# Patient Record
Sex: FEMALE | Race: WHITE | NOT HISPANIC OR LATINO | URBAN - METROPOLITAN AREA
[De-identification: names, ages, dates, MRNs, and addresses within clinical notes are randomized per-mention and may not be internally consistent; named-entity substitution may affect disease eponyms.]

---

## 2017-10-24 ENCOUNTER — INPATIENT (INPATIENT)
Facility: HOSPITAL | Age: 22
LOS: 2 days | Discharge: HOME | DRG: 392 | End: 2017-10-27
Attending: HOSPITALIST | Admitting: HOSPITALIST
Payer: MEDICAID

## 2017-10-24 VITALS
RESPIRATION RATE: 17 BRPM | HEART RATE: 93 BPM | SYSTOLIC BLOOD PRESSURE: 108 MMHG | DIASTOLIC BLOOD PRESSURE: 75 MMHG | TEMPERATURE: 98 F | OXYGEN SATURATION: 98 %

## 2017-10-24 DIAGNOSIS — Z29.9 ENCOUNTER FOR PROPHYLACTIC MEASURES, UNSPECIFIED: ICD-10-CM

## 2017-10-24 DIAGNOSIS — R11.2 NAUSEA WITH VOMITING, UNSPECIFIED: ICD-10-CM

## 2017-10-24 DIAGNOSIS — R10.9 UNSPECIFIED ABDOMINAL PAIN: ICD-10-CM

## 2017-10-24 DIAGNOSIS — Z90.49 ACQUIRED ABSENCE OF OTHER SPECIFIED PARTS OF DIGESTIVE TRACT: Chronic | ICD-10-CM

## 2017-10-24 DIAGNOSIS — Z90.89 ACQUIRED ABSENCE OF OTHER ORGANS: Chronic | ICD-10-CM

## 2017-10-24 DIAGNOSIS — D68.0 VON WILLEBRAND DISEASE: ICD-10-CM

## 2017-10-24 DIAGNOSIS — K92.2 GASTROINTESTINAL HEMORRHAGE, UNSPECIFIED: ICD-10-CM

## 2017-10-24 LAB
ALBUMIN SERPL ELPH-MCNC: 4.8 G/DL — SIGNIFICANT CHANGE UP (ref 3.3–5)
ALP SERPL-CCNC: 68 U/L — SIGNIFICANT CHANGE UP (ref 40–120)
ALT FLD-CCNC: 11 U/L RC — SIGNIFICANT CHANGE UP (ref 10–45)
ANION GAP SERPL CALC-SCNC: 14 MMOL/L — SIGNIFICANT CHANGE UP (ref 5–17)
APPEARANCE UR: ABNORMAL
APTT BLD: 32.4 SEC — SIGNIFICANT CHANGE UP (ref 27.5–37.4)
AST SERPL-CCNC: 16 U/L — SIGNIFICANT CHANGE UP (ref 10–40)
BASOPHILS # BLD AUTO: 0 K/UL — SIGNIFICANT CHANGE UP (ref 0–0.2)
BASOPHILS NFR BLD AUTO: 0.6 % — SIGNIFICANT CHANGE UP (ref 0–2)
BILIRUB SERPL-MCNC: 1 MG/DL — SIGNIFICANT CHANGE UP (ref 0.2–1.2)
BILIRUB UR-MCNC: NEGATIVE — SIGNIFICANT CHANGE UP
BLD GP AB SCN SERPL QL: NEGATIVE — SIGNIFICANT CHANGE UP
BUN SERPL-MCNC: 9 MG/DL — SIGNIFICANT CHANGE UP (ref 7–23)
CALCIUM SERPL-MCNC: 9.7 MG/DL — SIGNIFICANT CHANGE UP (ref 8.4–10.5)
CHLORIDE SERPL-SCNC: 100 MMOL/L — SIGNIFICANT CHANGE UP (ref 96–108)
CO2 SERPL-SCNC: 28 MMOL/L — SIGNIFICANT CHANGE UP (ref 22–31)
COLOR SPEC: YELLOW — SIGNIFICANT CHANGE UP
COMMENT - URINE: SIGNIFICANT CHANGE UP
CREAT SERPL-MCNC: 0.67 MG/DL — SIGNIFICANT CHANGE UP (ref 0.5–1.3)
CRP SERPL-MCNC: 2 MG/DL — HIGH (ref 0–0.4)
DIFF PNL FLD: NEGATIVE — SIGNIFICANT CHANGE UP
EOSINOPHIL # BLD AUTO: 0.1 K/UL — SIGNIFICANT CHANGE UP (ref 0–0.5)
EOSINOPHIL NFR BLD AUTO: 1.1 % — SIGNIFICANT CHANGE UP (ref 0–6)
EPI CELLS # UR: SIGNIFICANT CHANGE UP /HPF
ERYTHROCYTE [SEDIMENTATION RATE] IN BLOOD: 14 MM/HR — SIGNIFICANT CHANGE UP (ref 0–15)
GAS PNL BLDV: SIGNIFICANT CHANGE UP
GLUCOSE SERPL-MCNC: 98 MG/DL — SIGNIFICANT CHANGE UP (ref 70–99)
GLUCOSE UR QL: NEGATIVE — SIGNIFICANT CHANGE UP
HCG UR QL: NEGATIVE — SIGNIFICANT CHANGE UP
HCT VFR BLD CALC: 40.3 % — SIGNIFICANT CHANGE UP (ref 34.5–45)
HCT VFR BLD CALC: 41.2 % — SIGNIFICANT CHANGE UP (ref 34.5–45)
HGB BLD-MCNC: 13.9 G/DL — SIGNIFICANT CHANGE UP (ref 11.5–15.5)
HGB BLD-MCNC: 14.4 G/DL — SIGNIFICANT CHANGE UP (ref 11.5–15.5)
INR BLD: 1.15 RATIO — SIGNIFICANT CHANGE UP (ref 0.88–1.16)
KETONES UR-MCNC: ABNORMAL
LEUKOCYTE ESTERASE UR-ACNC: ABNORMAL
LYMPHOCYTES # BLD AUTO: 1.3 K/UL — SIGNIFICANT CHANGE UP (ref 1–3.3)
LYMPHOCYTES # BLD AUTO: 20.5 % — SIGNIFICANT CHANGE UP (ref 13–44)
MCHC RBC-ENTMCNC: 30.8 PG — SIGNIFICANT CHANGE UP (ref 27–34)
MCHC RBC-ENTMCNC: 31.3 PG — SIGNIFICANT CHANGE UP (ref 27–34)
MCHC RBC-ENTMCNC: 34.4 GM/DL — SIGNIFICANT CHANGE UP (ref 32–36)
MCHC RBC-ENTMCNC: 34.8 GM/DL — SIGNIFICANT CHANGE UP (ref 32–36)
MCV RBC AUTO: 89.6 FL — SIGNIFICANT CHANGE UP (ref 80–100)
MCV RBC AUTO: 89.8 FL — SIGNIFICANT CHANGE UP (ref 80–100)
MONOCYTES # BLD AUTO: 0.6 K/UL — SIGNIFICANT CHANGE UP (ref 0–0.9)
MONOCYTES NFR BLD AUTO: 10 % — SIGNIFICANT CHANGE UP (ref 2–14)
NEUTROPHILS # BLD AUTO: 4.2 K/UL — SIGNIFICANT CHANGE UP (ref 1.8–7.4)
NEUTROPHILS NFR BLD AUTO: 67.8 % — SIGNIFICANT CHANGE UP (ref 43–77)
NITRITE UR-MCNC: NEGATIVE — SIGNIFICANT CHANGE UP
PH UR: 6 — SIGNIFICANT CHANGE UP (ref 5–8)
PLATELET # BLD AUTO: 225 K/UL — SIGNIFICANT CHANGE UP (ref 150–400)
PLATELET # BLD AUTO: 257 K/UL — SIGNIFICANT CHANGE UP (ref 150–400)
POTASSIUM SERPL-MCNC: 3.9 MMOL/L — SIGNIFICANT CHANGE UP (ref 3.5–5.3)
POTASSIUM SERPL-SCNC: 3.9 MMOL/L — SIGNIFICANT CHANGE UP (ref 3.5–5.3)
PROT SERPL-MCNC: 7.5 G/DL — SIGNIFICANT CHANGE UP (ref 6–8.3)
PROT UR-MCNC: 30 MG/DL
PROTHROM AB SERPL-ACNC: 12.6 SEC — SIGNIFICANT CHANGE UP (ref 9.8–12.7)
RBC # BLD: 4.5 M/UL — SIGNIFICANT CHANGE UP (ref 3.8–5.2)
RBC # BLD: 4.59 M/UL — SIGNIFICANT CHANGE UP (ref 3.8–5.2)
RBC # FLD: 10.9 % — SIGNIFICANT CHANGE UP (ref 10.3–14.5)
RBC # FLD: 11 % — SIGNIFICANT CHANGE UP (ref 10.3–14.5)
RBC CASTS # UR COMP ASSIST: SIGNIFICANT CHANGE UP /HPF (ref 0–2)
RH IG SCN BLD-IMP: POSITIVE — SIGNIFICANT CHANGE UP
SODIUM SERPL-SCNC: 142 MMOL/L — SIGNIFICANT CHANGE UP (ref 135–145)
SP GR SPEC: >1.03 — HIGH (ref 1.01–1.02)
UROBILINOGEN FLD QL: 1
WBC # BLD: 4 K/UL — SIGNIFICANT CHANGE UP (ref 3.8–10.5)
WBC # BLD: 6.2 K/UL — SIGNIFICANT CHANGE UP (ref 3.8–10.5)
WBC # FLD AUTO: 4 K/UL — SIGNIFICANT CHANGE UP (ref 3.8–10.5)
WBC # FLD AUTO: 6.2 K/UL — SIGNIFICANT CHANGE UP (ref 3.8–10.5)
WBC UR QL: SIGNIFICANT CHANGE UP /HPF (ref 0–5)

## 2017-10-24 PROCEDURE — 99223 1ST HOSP IP/OBS HIGH 75: CPT

## 2017-10-24 PROCEDURE — 74177 CT ABD & PELVIS W/CONTRAST: CPT | Mod: 26

## 2017-10-24 PROCEDURE — 99285 EMERGENCY DEPT VISIT HI MDM: CPT | Mod: 25

## 2017-10-24 PROCEDURE — 99222 1ST HOSP IP/OBS MODERATE 55: CPT | Mod: GC

## 2017-10-24 RX ORDER — ONDANSETRON 8 MG/1
4 TABLET, FILM COATED ORAL ONCE
Qty: 0 | Refills: 0 | Status: COMPLETED | OUTPATIENT
Start: 2017-10-24 | End: 2017-10-24

## 2017-10-24 RX ORDER — SODIUM CHLORIDE 9 MG/ML
1000 INJECTION INTRAMUSCULAR; INTRAVENOUS; SUBCUTANEOUS
Qty: 0 | Refills: 0 | Status: DISCONTINUED | OUTPATIENT
Start: 2017-10-24 | End: 2017-10-26

## 2017-10-24 RX ORDER — ACETAMINOPHEN 500 MG
1000 TABLET ORAL ONCE
Qty: 0 | Refills: 0 | Status: COMPLETED | OUTPATIENT
Start: 2017-10-24 | End: 2017-10-24

## 2017-10-24 RX ORDER — SOD SULF/SODIUM/NAHCO3/KCL/PEG
1000 SOLUTION, RECONSTITUTED, ORAL ORAL
Qty: 0 | Refills: 0 | Status: COMPLETED | OUTPATIENT
Start: 2017-10-24 | End: 2017-10-24

## 2017-10-24 RX ORDER — ACETAMINOPHEN 500 MG
650 TABLET ORAL EVERY 6 HOURS
Qty: 0 | Refills: 0 | Status: DISCONTINUED | OUTPATIENT
Start: 2017-10-24 | End: 2017-10-27

## 2017-10-24 RX ORDER — SODIUM CHLORIDE 9 MG/ML
1000 INJECTION INTRAMUSCULAR; INTRAVENOUS; SUBCUTANEOUS ONCE
Qty: 0 | Refills: 0 | Status: COMPLETED | OUTPATIENT
Start: 2017-10-24 | End: 2017-10-24

## 2017-10-24 RX ORDER — ONDANSETRON 8 MG/1
4 TABLET, FILM COATED ORAL EVERY 8 HOURS
Qty: 0 | Refills: 0 | Status: DISCONTINUED | OUTPATIENT
Start: 2017-10-24 | End: 2017-10-24

## 2017-10-24 RX ORDER — MORPHINE SULFATE 50 MG/1
2 CAPSULE, EXTENDED RELEASE ORAL ONCE
Qty: 0 | Refills: 0 | Status: DISCONTINUED | OUTPATIENT
Start: 2017-10-24 | End: 2017-10-24

## 2017-10-24 RX ORDER — ONDANSETRON 8 MG/1
4 TABLET, FILM COATED ORAL EVERY 8 HOURS
Qty: 0 | Refills: 0 | Status: DISCONTINUED | OUTPATIENT
Start: 2017-10-24 | End: 2017-10-26

## 2017-10-24 RX ORDER — ACETAMINOPHEN 500 MG
650 TABLET ORAL EVERY 6 HOURS
Qty: 0 | Refills: 0 | Status: DISCONTINUED | OUTPATIENT
Start: 2017-10-24 | End: 2017-10-24

## 2017-10-24 RX ORDER — MORPHINE SULFATE 50 MG/1
4 CAPSULE, EXTENDED RELEASE ORAL EVERY 6 HOURS
Qty: 0 | Refills: 0 | Status: DISCONTINUED | OUTPATIENT
Start: 2017-10-24 | End: 2017-10-26

## 2017-10-24 RX ORDER — INFLUENZA VIRUS VACCINE 15; 15; 15; 15 UG/.5ML; UG/.5ML; UG/.5ML; UG/.5ML
0.5 SUSPENSION INTRAMUSCULAR ONCE
Qty: 0 | Refills: 0 | Status: DISCONTINUED | OUTPATIENT
Start: 2017-10-24 | End: 2017-10-27

## 2017-10-24 RX ADMIN — MORPHINE SULFATE 4 MILLIGRAM(S): 50 CAPSULE, EXTENDED RELEASE ORAL at 17:53

## 2017-10-24 RX ADMIN — Medication 1000 MILLILITER(S): at 19:48

## 2017-10-24 RX ADMIN — Medication 650 MILLIGRAM(S): at 14:00

## 2017-10-24 RX ADMIN — ONDANSETRON 4 MILLIGRAM(S): 8 TABLET, FILM COATED ORAL at 17:53

## 2017-10-24 RX ADMIN — SODIUM CHLORIDE 4000 MILLILITER(S): 9 INJECTION INTRAMUSCULAR; INTRAVENOUS; SUBCUTANEOUS at 16:12

## 2017-10-24 RX ADMIN — Medication 650 MILLIGRAM(S): at 23:21

## 2017-10-24 RX ADMIN — Medication 400 MILLIGRAM(S): at 03:52

## 2017-10-24 RX ADMIN — ONDANSETRON 4 MILLIGRAM(S): 8 TABLET, FILM COATED ORAL at 03:52

## 2017-10-24 RX ADMIN — Medication 1000 MILLILITER(S): at 22:52

## 2017-10-24 RX ADMIN — SODIUM CHLORIDE 100 MILLILITER(S): 9 INJECTION INTRAMUSCULAR; INTRAVENOUS; SUBCUTANEOUS at 17:53

## 2017-10-24 RX ADMIN — MORPHINE SULFATE 4 MILLIGRAM(S): 50 CAPSULE, EXTENDED RELEASE ORAL at 18:08

## 2017-10-24 RX ADMIN — MORPHINE SULFATE 2 MILLIGRAM(S): 50 CAPSULE, EXTENDED RELEASE ORAL at 07:54

## 2017-10-24 RX ADMIN — MORPHINE SULFATE 2 MILLIGRAM(S): 50 CAPSULE, EXTENDED RELEASE ORAL at 07:08

## 2017-10-24 RX ADMIN — Medication 1000 MILLIGRAM(S): at 04:00

## 2017-10-24 RX ADMIN — SODIUM CHLORIDE 2000 MILLILITER(S): 9 INJECTION INTRAMUSCULAR; INTRAVENOUS; SUBCUTANEOUS at 03:52

## 2017-10-24 NOTE — H&P ADULT - PROBLEM SELECTOR PLAN 2
with anorexia and poor PO intake- likely 2/2 IBD vs. enteritis  - IVF hydration NS bolus 1L  x 1, followed by 100cc/hr  - clear liquid diet as tolerated, NPO p MN and moviprep for colonoscopy  - zofran PRN, check baseline EKG

## 2017-10-24 NOTE — H&P ADULT - FAMILY HISTORY
Mother  Still living? Unknown  Family history of irritable bowel syndrome, Age at diagnosis: Age Unknown

## 2017-10-24 NOTE — ED PROVIDER NOTE - MEDICAL DECISION MAKING DETAILS
Jim resident: 22 y/o female hx of 1 year of intermittent abd pain, diffuse, comes in episodes p/w abd pain and fever - per patient, few days of diffuse pain with diarrhea, with few episodes of bloody diarrhea - reports dec PO itnake -also reports dysuria for past few days - exam without signifcant tenderness or guarding - history suspicious for colitis - never been diagnosed with UC or chrons - never followed up with GI - will check labs, urine, hydrate, and consider CT for colitis, although patient ultimately needs GI workup

## 2017-10-24 NOTE — ED ADULT NURSE NOTE - OBJECTIVE STATEMENT
pt is 21 year old female c/o diffuse abd pain that she has had for about a year, worsening over the past 2 months, +diarrhea, +blood in stool, +low grade fever starting last night, +nausea, no  vomiting, pt reports burning on urination, recently had ob/gyn exam neg for std, sexually active with 1 partner and uses condoms, no back pain, no cough, no weight loss, no distention, pt has hx of appendectomy

## 2017-10-24 NOTE — ED PROVIDER NOTE - PROGRESS NOTE DETAILS
- MD Valeri,PhD - Resident: patient signed out to me. Had possible active extravasation seen on CTAP w/o OC IC. Asked to page GI. I had GI paged 3 times with no callback at about 0730, 0800, and again at 0900. Will defer GI page to floor team.

## 2017-10-24 NOTE — H&P ADULT - PROBLEM SELECTOR PLAN 4
- ppx: hold hsq pending H/O recommendations  - diet: clears followed by moviprep and NPO p MN  - dispo: medical floor

## 2017-10-24 NOTE — ED PROVIDER NOTE - ATTENDING CONTRIBUTION TO CARE
20 y/o female [facility]ram with 1 year hx of abd pain p.w abd pain and fever. Per patient, for past year gets intermittent bouts of abd pain with a few bloody diarrheas. pt with diffuse abd pain, but non surgical abdomin, soft. ct ordered, labs found to have collection in cecal region to admit for further work up.

## 2017-10-24 NOTE — H&P ADULT - NSHPLABSRESULTS_GEN_ALL_CORE
.  LABS:                         13.9   4.0   )-----------( 225      ( 24 Oct 2017 14:07 )             40.3     10-24    142  |  100  |  9   ----------------------------<  98  3.9   |  28  |  0.67    Ca    9.7      24 Oct 2017 03:49    TPro  7.5  /  Alb  4.8  /  TBili  1.0  /  DBili  x   /  AST  16  /  ALT  11  /  AlkPhos  68  10-24      Urinalysis Basic - ( 24 Oct 2017 03:51 )    Color: Yellow / Appearance: SL Turbid / SG: >1.030 / pH: x  Gluc: x / Ketone: Moderate  / Bili: Negative / Urobili: 1   Blood: x / Protein: 30 mg/dL / Nitrite: Negative   Leuk Esterase: Small / RBC: 0-2 /HPF / WBC 6-10 /HPF   Sq Epi: x / Non Sq Epi: Few /HPF / Bacteria: x      < from: CT Abdomen and Pelvis w/ Oral Cont and w/ IV Cont (10.24.17 @ 06:19) >    Few thickened loops of small bowel seen within the lower abdomen, may be   seen in the setting of enteritis.    Fluid/heterogeneous material filling the cecum with intraluminal area of   ill-defined hyperattenuation, which may represent blood, however, area of   active extravasation cannot be excluded. Correlate for degree of   patient's bleeding per rectum. If clinical suspicion for active GI bleed   is high, further evaluation with bleeding scan may be obtained.     Status post appendectomy.    < end of copied text >

## 2017-10-24 NOTE — ED PROVIDER NOTE - OBJECTIVE STATEMENT
22 y/o female with 1 year hx of abd pain p.w abd pain and fever. Per patient, for past year gets intermittent bouts of abd pain with optionally diarrhea. States this episode started on Wednesday and on Friday developed fever, Tmax 100.5. States today pain was so severe she was on the floor. Reports a few bloody diarrheas as well, with blood on the stool in the toilet. Associated few episodes of emesis, non bloody. NEver had colonoscopy or endoscopy. Sexually active with boyfriend, uses condoms regularly - just recently STD tested by OB/GYN. Does report occasionally dysuria without frequency. Denies vaginal discharge. Denies any CP, SOB, numbness, paresthesias, CVA tenderness. Has not taken anything for pain. 22 y/o female with 1 year hx of abd pain p.w abd pain and fever. Per patient, for past year gets intermittent bouts of abd pain with optionally diarrhea. States this episode started on Wednesday and on Friday developed fever, Tmax 100.5. States today pain was so severe she was on the floor. Reports a few bloody diarrheas as well, with blood on the stool in the toilet. Associated few episodes of emesis, non bloody. NEver had colonoscopy or endoscopy. Sexually active with boyfriend, uses condoms regularly - just recently STD tested by OB/GYN. Does report occasionally dysuria without frequency. Denies vaginal discharge. Denies any CP, SOB, numbness, paresthesias, CVA tenderness. Has not taken anything for pain. LMP 1 month ago. 22 y/o female [facility] Bertram with 1 year hx of abd pain p.w abd pain and fever. Per patient, for past year gets intermittent bouts of abd pain with optionally diarrhea. States this episode started on Wednesday and on Friday developed fever, Tmax 100.5. States today pain was so severe she was on the floor. Reports a few bloody diarrheas as well, with blood on the stool in the toilet. Associated few episodes of emesis, non bloody. NEver had colonoscopy or endoscopy. Sexually active with boyfriend, uses condoms regularly - just recently STD tested by OB/GYN. Does report occasionally dysuria without frequency. Denies vaginal discharge. Denies any CP, SOB, numbness, paresthesias, CVA tenderness. Has not taken anything for pain. LMP 1 month ago.

## 2017-10-24 NOTE — H&P ADULT - HISTORY OF PRESENT ILLNESS
21f hx vwd, chronic abdominal pain x 1 year presenting with acute on chronic abdominal pain x 2-3 days. States she always has low level, diffusely crampy/dull abdominal pain which intermittently flares, but recently experienced worsening abdominal pain, mainly periumbilical which has been constant, rated 6-7/10 at its worst, associated with nausea and 1 episode of nbnb vomiting last night, anorexia without significant weight loss x several days, fevers with 100.5 being the highest. Also has had diarrhea, 5-6 bowel movements daily x 2-3 days, nonbloody and mainly loose/watery, with some mucous passage. Unsure about IBD history in family. Reports travel in june to [place], however no change in baseline symptomatology during/after trip. No other symptoms reported- no dysuria, no cough, no chest pain.     In ED: 98.1, 69, 103/72, 18, 99RA. Given morphine 2mg iv x 1, tylenol 650mg po x 1 with minor relief. Orthostatics found to be negative. CT scan performed, GI consulted.

## 2017-10-24 NOTE — CONSULT NOTE ADULT - SUBJECTIVE AND OBJECTIVE BOX
Chief Complaint:  Patient is a 21y old  Female who presents with a chief complaint of     HPI:  22 y/o female PMH Bertram and chronic abd pain (for 1 year) presented to the ED with c/c of abd pain and fever.   As per the patient, she has had intermittent bouts of abd pain associated with diarrhea. Current episode started on Wednesday and on Friday she developed a fever, Tmax 100.5. Her abd pain was 10/10, assoc with blood in stool. She also had few episodes of emesis, non bloody.     She had never had colonoscopy or endoscopy.   Denies any CP, SOB, numbness, paresthesias, CVA tenderness. Has not taken anything for pain. LMP 1 month ago.      Allergies:  oxycodone (Hives)  Seafood (Hives)      Home Medications:    Hospital Medications:      PMHX/PSHX:  History of tonsillectomy  History of appendectomy      Family history:      Social History:   Sexually active with boyfriend, uses condoms regularly - just recently STD tested by OB/GYN.    ROS:     General:  No wt loss, fevers, chills, night sweats, fatigue,   Eyes:  Good vision, no reported pain  ENT:  No sore throat, pain, runny nose, dysphagia  CV:  No pain, palpitations, hypo/hypertension  Resp:  No dyspnea, cough, tachypnea, wheezing  GI:  See HPI  :  No pain, bleeding, incontinence, nocturia  Muscle:  No pain, weakness  Neuro:  No weakness, tingling, memory problems  Psych:  No fatigue, insomnia, mood problems, depression  Endocrine:  No polyuria, polydipsia, cold/heat intolerance  Heme:  No petechiae, ecchymosis, easy bruisability  Skin:  No rash, edema      PHYSICAL EXAM:     GENERAL:  Appears stated age, well-groomed, well-nourished, no distress  HEENT:  NC/AT,  conjunctivae clear and pink,  no JVD  CHEST:  Full & symmetric excursion, no increased effort, breath sounds clear  HEART:  Regular rhythm, S1, S2, no murmur/rub/S3/S4, no abdominal bruit, no edema  ABDOMEN:  Soft, non-tender, non-distended, normoactive bowel sounds,  no masses ,  EXTREMITIES:  no cyanosis,clubbing or edema  SKIN:  No rash/erythema/ecchymoses/petechiae/wounds/abscess/warm/dry  NEURO:  Alert, oriented    Vital Signs:  Vital Signs Last 24 Hrs  T(C): 36.7 (24 Oct 2017 09:36), Max: 36.9 (24 Oct 2017 01:38)  T(F): 98 (24 Oct 2017 09:36), Max: 98.5 (24 Oct 2017 01:38)  HR: 76 (24 Oct 2017 09:36) (76 - 93)  BP: 100/66 (24 Oct 2017 09:36) (100/66 - 108/75)  BP(mean): --  RR: 18 (24 Oct 2017 09:36) (16 - 18)  SpO2: 98% (24 Oct 2017 09:36) (95% - 98%)  Daily     Daily     LABS:                        14.4   6.2   )-----------( 257      ( 24 Oct 2017 03:49 )             41.2     10-24    142  |  100  |  9   ----------------------------<  98  3.9   |  28  |  0.67    Ca    9.7      24 Oct 2017 03:49    TPro  7.5  /  Alb  4.8  /  TBili  1.0  /  DBili  x   /  AST  16  /  ALT  11  /  AlkPhos  68  10-24    LIVER FUNCTIONS - ( 24 Oct 2017 03:49 )  Alb: 4.8 g/dL / Pro: 7.5 g/dL / ALK PHOS: 68 U/L / ALT: 11 U/L RC / AST: 16 U/L / GGT: x             Urinalysis Basic - ( 24 Oct 2017 03:51 )    Color: Yellow / Appearance: SL Turbid / SG: >1.030 / pH: x  Gluc: x / Ketone: Moderate  / Bili: Negative / Urobili: 1   Blood: x / Protein: 30 mg/dL / Nitrite: Negative   Leuk Esterase: Small / RBC: 0-2 /HPF / WBC 6-10 /HPF   Sq Epi: x / Non Sq Epi: Few /HPF / Bacteria: x          Imaging:      < from: CT Abdomen and Pelvis w/ Oral Cont and w/ IV Cont (10.24.17 @ 06:19) >  FINDINGS:    LOWER CHEST: Within normal limits.    LIVER: Within normal limits.  BILE DUCTS: Normal caliber.  GALLBLADDER: Within normal limits.  SPLEEN: Within normal limits.  PANCREAS: Within normal limits.  ADRENALS: Within normal limits.  KIDNEYS/URETERS: Within normal limits.    BLADDER: Within normal limits.  REPRODUCTIVE ORGANS: The uterus and adnexa are within normal limits.    BOWEL: No bowel obstruction. Oral contrast material has not yet reached   the cecum. A few thickened loops of small bowel seen within the lower   abdomen, which may be seen in the setting of enteritis. The patient is   status post appendectomy. The cecum is filled with fluid/heterogeneous   material with areas of central hyperattenuation, which is nonspecific.   There is fluid within the ascending colon.   PERITONEUM: Trace free fluid within the pelvis.  VESSELS:  Within normal limits.  RETROPERITONEUM: No lymphadenopathy.    ABDOMINAL WALL: Within normal limits.  BONES: Within normal limits.    IMPRESSION:     Few thickened loops of small bowel seen within the lower abdomen, may be   seen in the setting of enteritis.    Fluid/heterogeneous material filling the cecum with intraluminal area of   ill-defined hyperattenuation, which may represent blood, however, area of   active extravasation cannot be excluded. Correlate for degree of   patient's bleeding per rectum. If clinical suspicion for active GI bleed   is high, further evaluation with bleeding scan may be obtained.     Status post appendectomy.    < end of copied text > Chief Complaint:  Patient is a 21y old  Female who presents with a chief complaint of     HPI:  22 y/o female PMH Bertram and chronic abd pain (for 1 year) presented to the ED with c/c of abd pain and fever.   As per the patient, she has had intermittent bouts of abd pain associated with diarrhea. Current episode started on Wednesday and on Friday she developed a fever, Tmax 100.5. Her abd pain was 10/10, assoc with blood in stool. She also had few episodes of emesis, non bloody. She also has had passage of mucus in the BM along with blood.  She had never had colonoscopy or endoscopy. She saw a GI doctor one year ago and the workup including CT scan at that time was negative.     Denies any CP, SOB, numbness, paresthesias, CVA tenderness. Has not taken anything for pain. LMP 1 month ago.    Allergies:  oxycodone (Hives)  Seafood (Hives)      Home Medications:  none    Hospital Medications:  None    PMHX/PSHX:  History of tonsillectomy  History of appendectomy      Family history:    denies history of IBD or cancer.    Social History:   Sexually active with boyfriend, uses condoms regularly - just recently STD tested by OB/GYN.    ROS:     General:  No wt loss, fevers, chills, night sweats, fatigue,   Eyes:  Good vision, no reported pain  ENT:  No sore throat, pain, runny nose, dysphagia  CV:  No pain, palpitations, hypo/hypertension  Resp:  No dyspnea, cough, tachypnea, wheezing  GI:  See HPI  :  No pain, bleeding, incontinence, nocturia  Muscle:  No pain, weakness  Neuro:  No weakness, tingling, memory problems  Psych:  No fatigue, insomnia, mood problems, depression  Endocrine:  No polyuria, polydipsia, cold/heat intolerance  Heme:  No petechiae, ecchymosis, easy bruisability  Skin:  No rash, edema      PHYSICAL EXAM:     GENERAL:  Appears stated age, well-groomed, well-nourished, no distress  HEENT:  NC/AT,  conjunctivae clear and pink,  no JVD  CHEST:  Full & symmetric excursion, no increased effort, breath sounds clear  HEART:  Regular rhythm, S1, S2, no murmur/rub/S3/S4, no abdominal bruit, no edema  ABDOMEN:  Soft, non-tender, non-distended, normoactive bowel sounds,  no masses ,  EXTREMITIES:  no cyanosis,clubbing or edema  SKIN:  No rash/erythema/ecchymoses/petechiae/wounds/abscess/warm/dry  NEURO:  Alert, oriented    Vital Signs:  Vital Signs Last 24 Hrs  T(C): 36.7 (24 Oct 2017 09:36), Max: 36.9 (24 Oct 2017 01:38)  T(F): 98 (24 Oct 2017 09:36), Max: 98.5 (24 Oct 2017 01:38)  HR: 76 (24 Oct 2017 09:36) (76 - 93)  BP: 100/66 (24 Oct 2017 09:36) (100/66 - 108/75)  BP(mean): --  RR: 18 (24 Oct 2017 09:36) (16 - 18)  SpO2: 98% (24 Oct 2017 09:36) (95% - 98%)  Daily     Daily     LABS:                        14.4   6.2   )-----------( 257      ( 24 Oct 2017 03:49 )             41.2     10-24    142  |  100  |  9   ----------------------------<  98  3.9   |  28  |  0.67    Ca    9.7      24 Oct 2017 03:49    TPro  7.5  /  Alb  4.8  /  TBili  1.0  /  DBili  x   /  AST  16  /  ALT  11  /  AlkPhos  68  10-24    LIVER FUNCTIONS - ( 24 Oct 2017 03:49 )  Alb: 4.8 g/dL / Pro: 7.5 g/dL / ALK PHOS: 68 U/L / ALT: 11 U/L RC / AST: 16 U/L / GGT: x             Urinalysis Basic - ( 24 Oct 2017 03:51 )    Color: Yellow / Appearance: SL Turbid / SG: >1.030 / pH: x  Gluc: x / Ketone: Moderate  / Bili: Negative / Urobili: 1   Blood: x / Protein: 30 mg/dL / Nitrite: Negative   Leuk Esterase: Small / RBC: 0-2 /HPF / WBC 6-10 /HPF   Sq Epi: x / Non Sq Epi: Few /HPF / Bacteria: x          Imaging:      < from: CT Abdomen and Pelvis w/ Oral Cont and w/ IV Cont (10.24.17 @ 06:19) >  FINDINGS:    LOWER CHEST: Within normal limits.    LIVER: Within normal limits.  BILE DUCTS: Normal caliber.  GALLBLADDER: Within normal limits.  SPLEEN: Within normal limits.  PANCREAS: Within normal limits.  ADRENALS: Within normal limits.  KIDNEYS/URETERS: Within normal limits.    BLADDER: Within normal limits.  REPRODUCTIVE ORGANS: The uterus and adnexa are within normal limits.    BOWEL: No bowel obstruction. Oral contrast material has not yet reached   the cecum. A few thickened loops of small bowel seen within the lower   abdomen, which may be seen in the setting of enteritis. The patient is   status post appendectomy. The cecum is filled with fluid/heterogeneous   material with areas of central hyperattenuation, which is nonspecific.   There is fluid within the ascending colon.   PERITONEUM: Trace free fluid within the pelvis.  VESSELS:  Within normal limits.  RETROPERITONEUM: No lymphadenopathy.    ABDOMINAL WALL: Within normal limits.  BONES: Within normal limits.    IMPRESSION:     Few thickened loops of small bowel seen within the lower abdomen, may be   seen in the setting of enteritis.    Fluid/heterogeneous material filling the cecum with intraluminal area of   ill-defined hyperattenuation, which may represent blood, however, area of   active extravasation cannot be excluded. Correlate for degree of   patient's bleeding per rectum. If clinical suspicion for active GI bleed   is high, further evaluation with bleeding scan may be obtained.     Status post appendectomy.    < end of copied text > Chief Complaint:  Patient is a 21y old  Female who presents with a chief complaint of     HPI:  22 y/o female PMH Bertram and chronic abd pain (for 1 year) presented to the ED with c/c of abd pain and fever.   As per the patient, she has had intermittent bouts of abd pain associated with diarrhea. Current episode started on Wednesday and on Friday she developed a fever, Tmax 100.5. Her abd pain was 10/10, assoc with blood in stool. She also had few episodes of emesis, non bloody. She also has had passage of mucus in the BM along with blood.  She had never had colonoscopy or endoscopy. She saw a GI doctor one year ago and the workup including CT scan at that time was negative.     Denies any CP, SOB, numbness, paresthesias, CVA tenderness. Has not taken anything for pain. LMP 1 month ago.    Allergies:  oxycodone (Hives)  Seafood (Hives)      Home Medications:  none    Hospital Medications:  None    PMHX/PSHX:  History of tonsillectomy  History of appendectomy      Family history:    denies history of IBD or cancer.    Social History:   Sexually active with boyfriend, uses condoms regularly - just recently STD tested by OB/GYN.  No EtoH No other drugs    ROS:     General:  No wt loss, fevers, chills, night sweats, fatigue,   Eyes:  Good vision, no reported pain  ENT:  No sore throat, pain, runny nose, dysphagia  CV:  No pain, palpitations, hypo/hypertension  Resp:  No dyspnea, cough, tachypnea, wheezing  GI:  See HPI  :  No pain, bleeding, incontinence, nocturia  Muscle:  No pain, weakness  Neuro:  No weakness, tingling, memory problems  Psych:  No fatigue, insomnia, mood problems, depression  Endocrine:  No polyuria, polydipsia, cold/heat intolerance  Heme:  No petechiae, ecchymosis, easy bruisability  Skin:  No rash, edema      PHYSICAL EXAM:     GENERAL:  Appears stated age, well-groomed, well-nourished, no distress  HEENT:  NC/AT,  conjunctivae clear and pink,  no JVD  CHEST:  Full & symmetric excursion, no increased effort, breath sounds clear  HEART:  Regular rhythm, S1, S2, no murmur/rub/S3/S4, no abdominal bruit, no edema  ABDOMEN:  Soft, non-tender, non-distended, normoactive bowel sounds,  no masses ,  EXTREMITIES:  no cyanosis,clubbing or edema  SKIN:  No rash/erythema/ecchymoses/petechiae/wounds/abscess/warm/dry  NEURO:  Alert, oriented    Vital Signs:  Vital Signs Last 24 Hrs  T(C): 36.7 (24 Oct 2017 09:36), Max: 36.9 (24 Oct 2017 01:38)  T(F): 98 (24 Oct 2017 09:36), Max: 98.5 (24 Oct 2017 01:38)  HR: 76 (24 Oct 2017 09:36) (76 - 93)  BP: 100/66 (24 Oct 2017 09:36) (100/66 - 108/75)  BP(mean): --  RR: 18 (24 Oct 2017 09:36) (16 - 18)  SpO2: 98% (24 Oct 2017 09:36) (95% - 98%)  Daily     Daily     LABS:                        14.4   6.2   )-----------( 257      ( 24 Oct 2017 03:49 )             41.2     10-24    142  |  100  |  9   ----------------------------<  98  3.9   |  28  |  0.67    Ca    9.7      24 Oct 2017 03:49    TPro  7.5  /  Alb  4.8  /  TBili  1.0  /  DBili  x   /  AST  16  /  ALT  11  /  AlkPhos  68  10-24    LIVER FUNCTIONS - ( 24 Oct 2017 03:49 )  Alb: 4.8 g/dL / Pro: 7.5 g/dL / ALK PHOS: 68 U/L / ALT: 11 U/L RC / AST: 16 U/L / GGT: x             Urinalysis Basic - ( 24 Oct 2017 03:51 )    Color: Yellow / Appearance: SL Turbid / SG: >1.030 / pH: x  Gluc: x / Ketone: Moderate  / Bili: Negative / Urobili: 1   Blood: x / Protein: 30 mg/dL / Nitrite: Negative   Leuk Esterase: Small / RBC: 0-2 /HPF / WBC 6-10 /HPF   Sq Epi: x / Non Sq Epi: Few /HPF / Bacteria: x          Imaging:      < from: CT Abdomen and Pelvis w/ Oral Cont and w/ IV Cont (10.24.17 @ 06:19) >  FINDINGS:    LOWER CHEST: Within normal limits.    LIVER: Within normal limits.  BILE DUCTS: Normal caliber.  GALLBLADDER: Within normal limits.  SPLEEN: Within normal limits.  PANCREAS: Within normal limits.  ADRENALS: Within normal limits.  KIDNEYS/URETERS: Within normal limits.    BLADDER: Within normal limits.  REPRODUCTIVE ORGANS: The uterus and adnexa are within normal limits.    BOWEL: No bowel obstruction. Oral contrast material has not yet reached   the cecum. A few thickened loops of small bowel seen within the lower   abdomen, which may be seen in the setting of enteritis. The patient is   status post appendectomy. The cecum is filled with fluid/heterogeneous   material with areas of central hyperattenuation, which is nonspecific.   There is fluid within the ascending colon.   PERITONEUM: Trace free fluid within the pelvis.  VESSELS:  Within normal limits.  RETROPERITONEUM: No lymphadenopathy.    ABDOMINAL WALL: Within normal limits.  BONES: Within normal limits.    IMPRESSION:     Few thickened loops of small bowel seen within the lower abdomen, may be   seen in the setting of enteritis.    Fluid/heterogeneous material filling the cecum with intraluminal area of   ill-defined hyperattenuation, which may represent blood, however, area of   active extravasation cannot be excluded. Correlate for degree of   patient's bleeding per rectum. If clinical suspicion for active GI bleed   is high, further evaluation with bleeding scan may be obtained.     Status post appendectomy.    < end of copied text >

## 2017-10-24 NOTE — ED PROVIDER NOTE - GASTROINTESTINAL, MLM
Abdomen soft, mild tenderness throughout. No guarding. Abdomen soft, mild tenderness throughout, worse in periumbilical and epgiastric. No CVA tenderness b/l.

## 2017-10-24 NOTE — CONSULT NOTE ADULT - ATTENDING COMMENTS
Seen with fellow on 10/24  1. Ileitis/Colitis  2. Abdominal Pain    Personally reviewed CT scan. Given symptoms this is likely Crohn's ileitis. Check ESR and CRP. Should check PPD as well.   Plan for colonoscopy.

## 2017-10-24 NOTE — H&P ADULT - PROBLEM SELECTOR PLAN 3
No major bleed history. Per patient, required transfusion of blood products 'for clotting,' prior to shoulder surgery in May 2017. No complications of surgery or anesthesia. Has OP H/O.  - consult H/O re: whether any workup or therapy required prior to colonoscopy  - check PT/PTT  - monitor for signs and symptoms of bleeding No major bleed history. Per patient, required transfusion of blood products 'for clotting,' prior to shoulder surgery in May 2017. No complications of surgery or anesthesia. Has OP H/O.  - pt is high bleed risk for low bleed risk procedure- consult H/O re: any further workup or pre-endoscopy treatment.  - check PT/PTT  - monitor for signs and symptoms of bleeding

## 2017-10-24 NOTE — CONSULT NOTE ADULT - ASSESSMENT
Impression:     Plan: Impression:   - Abdominal pain with passage of mucus and blood in stool and episodes of diarrhea, ddx includes IBD vs IBS vs infection    Plan:   - monitor CBC, transfuse as needed  - send stool studies   - plan for colonoscopy on 10.25  - clear liquid diet today, moviprep in the evening today (GI fellow will order it) and npo after midnight  - supportive care as per primary team    GI team will continue to follow  Thank you for the consult.

## 2017-10-24 NOTE — H&P ADULT - NSHPSOCIALHISTORY_GEN_ALL_CORE
nonsmoker, occasional social drinker, no drugs, sexually active with boyfriend, uses condoms every time, no STD symptoms, had recent STD screen performed at [facility]. Goes to [employer], lives in dorm, no sick contacts.

## 2017-10-24 NOTE — H&P ADULT - PROBLEM SELECTOR PLAN 1
History highly concerning for IBD vs. possible focal enteritis vs possible  - colonoscopy with GI tomorrow 10/25. Clear liquid diet as tolerated, followed by moviprep (GI to order), followed by NPO p MN.  - pain control with morphine 4mg iv q6 prn severe pain, tylenol for breakthrough  - check stool studies- culture and wbcs, fecal calprotectin. No indication for cdiff at this time.  - serial abdominal exams

## 2017-10-25 ENCOUNTER — TRANSCRIPTION ENCOUNTER (OUTPATIENT)
Age: 22
End: 2017-10-25

## 2017-10-25 ENCOUNTER — RESULT REVIEW (OUTPATIENT)
Age: 22
End: 2017-10-25

## 2017-10-25 LAB
ANION GAP SERPL CALC-SCNC: 11 MMOL/L — SIGNIFICANT CHANGE UP (ref 5–17)
BUN SERPL-MCNC: 4 MG/DL — LOW (ref 7–23)
CALCIUM SERPL-MCNC: 8.8 MG/DL — SIGNIFICANT CHANGE UP (ref 8.4–10.5)
CHLORIDE SERPL-SCNC: 107 MMOL/L — SIGNIFICANT CHANGE UP (ref 96–108)
CO2 SERPL-SCNC: 24 MMOL/L — SIGNIFICANT CHANGE UP (ref 22–31)
CREAT SERPL-MCNC: 0.62 MG/DL — SIGNIFICANT CHANGE UP (ref 0.5–1.3)
CULTURE RESULTS: SIGNIFICANT CHANGE UP
FACT VIII ACT/NOR PPP: 80 % — SIGNIFICANT CHANGE UP (ref 60–125)
GLUCOSE SERPL-MCNC: 89 MG/DL — SIGNIFICANT CHANGE UP (ref 70–99)
HCT VFR BLD CALC: 37.3 % — SIGNIFICANT CHANGE UP (ref 34.5–45)
HGB BLD-MCNC: 12.7 G/DL — SIGNIFICANT CHANGE UP (ref 11.5–15.5)
MCHC RBC-ENTMCNC: 30.8 PG — SIGNIFICANT CHANGE UP (ref 27–34)
MCHC RBC-ENTMCNC: 34 GM/DL — SIGNIFICANT CHANGE UP (ref 32–36)
MCV RBC AUTO: 90.6 FL — SIGNIFICANT CHANGE UP (ref 80–100)
PLATELET # BLD AUTO: 217 K/UL — SIGNIFICANT CHANGE UP (ref 150–400)
POTASSIUM SERPL-MCNC: 4.1 MMOL/L — SIGNIFICANT CHANGE UP (ref 3.5–5.3)
POTASSIUM SERPL-SCNC: 4.1 MMOL/L — SIGNIFICANT CHANGE UP (ref 3.5–5.3)
RBC # BLD: 4.12 M/UL — SIGNIFICANT CHANGE UP (ref 3.8–5.2)
RBC # FLD: 11 % — SIGNIFICANT CHANGE UP (ref 10.3–14.5)
SODIUM SERPL-SCNC: 142 MMOL/L — SIGNIFICANT CHANGE UP (ref 135–145)
SPECIMEN SOURCE: SIGNIFICANT CHANGE UP
VWF AG ACT/NOR PPP IA: 78 % — SIGNIFICANT CHANGE UP (ref 63–170)
VWF:RCO ACT/NOR PPP PL AGG: 71 % — SIGNIFICANT CHANGE UP (ref 45–133)
WBC # BLD: 3.8 K/UL — SIGNIFICANT CHANGE UP (ref 3.8–10.5)
WBC # FLD AUTO: 3.8 K/UL — SIGNIFICANT CHANGE UP (ref 3.8–10.5)

## 2017-10-25 PROCEDURE — 99233 SBSQ HOSP IP/OBS HIGH 50: CPT

## 2017-10-25 PROCEDURE — 88305 TISSUE EXAM BY PATHOLOGIST: CPT | Mod: 26

## 2017-10-25 PROCEDURE — 44389 COLONOSCOPY WITH BIOPSY: CPT | Mod: GC

## 2017-10-25 RX ORDER — DESMOPRESSIN ACETATE 0.1 MG/1
21 TABLET ORAL ONCE
Qty: 0 | Refills: 0 | Status: DISCONTINUED | OUTPATIENT
Start: 2017-10-25 | End: 2017-10-25

## 2017-10-25 RX ORDER — DESMOPRESSIN ACETATE 0.1 MG/1
21 TABLET ORAL ONCE
Qty: 0 | Refills: 0 | Status: COMPLETED | OUTPATIENT
Start: 2017-10-25 | End: 2017-10-25

## 2017-10-25 RX ADMIN — Medication 650 MILLIGRAM(S): at 00:00

## 2017-10-25 RX ADMIN — ONDANSETRON 4 MILLIGRAM(S): 8 TABLET, FILM COATED ORAL at 20:53

## 2017-10-25 RX ADMIN — ONDANSETRON 4 MILLIGRAM(S): 8 TABLET, FILM COATED ORAL at 02:31

## 2017-10-25 RX ADMIN — MORPHINE SULFATE 4 MILLIGRAM(S): 50 CAPSULE, EXTENDED RELEASE ORAL at 03:03

## 2017-10-25 RX ADMIN — ONDANSETRON 4 MILLIGRAM(S): 8 TABLET, FILM COATED ORAL at 12:27

## 2017-10-25 RX ADMIN — Medication 650 MILLIGRAM(S): at 09:58

## 2017-10-25 RX ADMIN — Medication 650 MILLIGRAM(S): at 23:05

## 2017-10-25 RX ADMIN — Medication 650 MILLIGRAM(S): at 10:30

## 2017-10-25 RX ADMIN — MORPHINE SULFATE 4 MILLIGRAM(S): 50 CAPSULE, EXTENDED RELEASE ORAL at 12:17

## 2017-10-25 RX ADMIN — MORPHINE SULFATE 4 MILLIGRAM(S): 50 CAPSULE, EXTENDED RELEASE ORAL at 02:28

## 2017-10-25 RX ADMIN — MORPHINE SULFATE 4 MILLIGRAM(S): 50 CAPSULE, EXTENDED RELEASE ORAL at 12:45

## 2017-10-25 RX ADMIN — SODIUM CHLORIDE 100 MILLILITER(S): 9 INJECTION INTRAMUSCULAR; INTRAVENOUS; SUBCUTANEOUS at 02:32

## 2017-10-25 RX ADMIN — DESMOPRESSIN ACETATE 221 MICROGRAM(S): 0.1 TABLET ORAL at 15:55

## 2017-10-25 NOTE — PROGRESS NOTE ADULT - SUBJECTIVE AND OBJECTIVE BOX
Patient is a 21y old  Female who presents with a chief complaint of Gastrointestinal hemorrhage (25 Oct 2017 10:22)        SUBJECTIVE / OVERNIGHT EVENTS: diffuse abdominal tenderness worse on left, no nausea or vomiting. +loose stools (received prep last night). +blood in stool. +menses now.      MEDICATIONS  (STANDING):  influenza   Vaccine 0.5 milliLiter(s) IntraMuscular once  sodium chloride 0.9%. 1000 milliLiter(s) (100 mL/Hr) IV Continuous <Continuous>    MEDICATIONS  (PRN):  acetaminophen   Tablet. 650 milliGRAM(s) Oral every 6 hours PRN Moderate Pain (4 - 6)  morphine  - Injectable 4 milliGRAM(s) IV Push every 6 hours PRN Severe Pain (7 - 10)  ondansetron Injectable 4 milliGRAM(s) IV Push every 8 hours PRN Nausea and/or Vomiting      Vital Signs Last 24 Hrs  T(C): 36.8 (25 Oct 2017 12:08), Max: 36.8 (24 Oct 2017 17:31)  T(F): 98.3 (25 Oct 2017 12:08), Max: 98.3 (25 Oct 2017 12:08)  HR: 65 (25 Oct 2017 12:08) (65 - 75)  BP: 101/68 (25 Oct 2017 12:08) (98/59 - 110/72)  BP(mean): 84 (24 Oct 2017 15:13) (84 - 84)  RR: 18 (25 Oct 2017 12:08) (18 - 18)  SpO2: 98% (25 Oct 2017 12:08) (97% - 99%)  CAPILLARY BLOOD GLUCOSE        I&O's Summary    24 Oct 2017 07:01  -  25 Oct 2017 07:00  --------------------------------------------------------  IN: 3300 mL / OUT: 400 mL / NET: 2900 mL    25 Oct 2017 07:01  -  25 Oct 2017 14:49  --------------------------------------------------------  IN: 0 mL / OUT: 0 mL / NET: 0 mL        PHYSICAL EXAM:  GENERAL: NAD  HEAD:  Atraumatic, Normocephalic  EYES: conjunctiva and sclera clear  NECK: No JVD  CHEST/LUNG: CTA b/l  HEART: S1 S2 RRR  ABDOMEN: +BS Soft, diffuse tenderness - worse in LUQ and mid abd  EXTREMITIES:  2+ DP Pulses, No c/c/e  NEUROLOGY: AAOx3, no focal deficits   SKIN: No rashes or lesions    LABS:                        12.7   3.8   )-----------( 217      ( 25 Oct 2017 12:12 )             37.3     10-25    142  |  107  |  4<L>  ----------------------------<  89  4.1   |  24  |  0.62    Ca    8.8      25 Oct 2017 12:12    TPro  7.5  /  Alb  4.8  /  TBili  1.0  /  DBili  x   /  AST  16  /  ALT  11  /  AlkPhos  68  10-24    PT/INR - ( 24 Oct 2017 16:20 )   PT: 12.6 sec;   INR: 1.15 ratio         PTT - ( 24 Oct 2017 16:20 )  PTT:32.4 sec      Urinalysis Basic - ( 24 Oct 2017 03:51 )    Color: Yellow / Appearance: SL Turbid / SG: >1.030 / pH: x  Gluc: x / Ketone: Moderate  / Bili: Negative / Urobili: 1   Blood: x / Protein: 30 mg/dL / Nitrite: Negative   Leuk Esterase: Small / RBC: 0-2 /HPF / WBC 6-10 /HPF   Sq Epi: x / Non Sq Epi: Few /HPF / Bacteria: x        RADIOLOGY & ADDITIONAL TESTS:    Imaging Personally Reviewed:  Consultant(s) Notes Reviewed: GI   Care Discussed with Consultants/Other Providers:

## 2017-10-25 NOTE — DISCHARGE NOTE ADULT - ADDITIONAL INSTRUCTIONS
Follow up at GI clinic in 1-2 weeks. Repeat colonoscopy in 6 months for surveillance. Follow up at Gastroenterologist in 1-2 weeks. Repeat colonoscopy in 6 months for surveillance. Follow up at Gastroenterologist next week for biopsy results.  Repeat colonoscopy in 6 months for surveillance per GI recommendations.

## 2017-10-25 NOTE — DISCHARGE NOTE ADULT - CARE PROVIDER_API CALL
Shaan Gregorio), Gastroenterology; Internal Medicine  [address] [place] [address]  Phone: [phone]  Fax: [phone] Nahum Gutierrez), Gastroenterology; Internal Medicine  [address] [place] [address]  Phone: [phone]  Fax: [phone] Nahum Gutierrez (MD), Gastroenterology; Internal Medicine  [address] [facility]  [address]  [place] [address]  Phone: [phone]  Fax: [phone]    Dr. Omar  Phone: (   )    -  Fax: (   )    -    Vincenzo Mcclure), Pediatrics  [address] [facility] [address]  [place] [address]  Phone: [phone]  Fax: [phone]

## 2017-10-25 NOTE — CONSULT NOTE ADULT - SUBJECTIVE AND OBJECTIVE BOX
HPI: 21f hx type I vwd, chronic abdominal pain x 1 year presenting with acute on chronic abdominal pain x 2-3 days. States she always has low level, diffusely crampy/dull abdominal pain which intermittently flares, but recently experienced worsening abdominal pain, mainly periumbilical which has been constant, rated 6-7/10 at its worst, associated with nausea and 1 episode of nbnb vomiting last night, anorexia without significant weight loss x several days, fevers with 100.5 being the highest. Also has had diarrhea, 5-6 bowel movements daily x 2-3 days, nonbloody and mainly loose/watery, with some mucous passage. Unsure about IBD history in family. Reports travel in june to [place], however no change in baseline symptomatology during/after trip. No other symptoms reported- no dysuria, no cough, no chest pain.     Heme/Onc history: Patient was diagnosed with Type I Von Willebrand Disease in 2014 after a tonsillectomy where they found a clot in posterior pharynx after the procedure. Patient has had multiple surgeries that includes appendectomy and shoulder surgery, prior to which she received IV DDAVP. Currently follows Dr. Nelson's group practice as outpatient (ph: [phone]). Patient denied any complications after prior surgeries, denied requiring blood transfusions. Reports menorrhagia, currently menstruating started yesterday, usually last 6-7 days, not on OCP or iron supplements. Denied any family history of blood disorders.    REVIEW OF SYSTEMS:    CONSTITUTIONAL: No weakness, fevers or chills  EYES/ENT: No visual changes;  No vertigo or throat pain   NECK: No pain or stiffness  RESPIRATORY: No cough, wheezing, hemoptysis; No shortness of breath  CARDIOVASCULAR: No chest pain or palpitations  GASTROINTESTINAL: +low abdominal pain, + BRPR stool, No nausea, vomiting, or hematemesis; No diarrhea or constipation.   GENITOURINARY: No dysuria, frequency or hematuria  NEUROLOGICAL: No numbness or weakness  SKIN: No itching, burning, rashes, or lesions   All other review of systems is negative unless indicated above.    PAST MEDICAL & SURGICAL HISTORY:  Von Willebrand disease  History of tonsillectomy  History of appendectomy    FAMILY HISTORY:  Family history of irritable bowel syndrome (Mother)    SOCIAL HISTORY:  nonsmoker, occasional social drinker, no drugs, sexually active with boyfriend, uses condoms every time, no STD symptoms, [facility] Goes to [employer] [facility] [employer], lives in dorm, no sick contacts.    Allergies    oxycodone (Hives)  Seafood (Hives) HPI: 21f hx type I vwd, chronic abdominal pain x 1 year presenting with acute on chronic abdominal pain x 2-3 days. States she always has low level, diffusely crampy/dull abdominal pain which intermittently flares, but recently experienced worsening abdominal pain, mainly periumbilical which has been constant, rated 6-7/10 at its worst, associated with nausea and 1 episode of nbnb vomiting last night, anorexia without significant weight loss x several days, fevers with 100.5 being the highest. Also has had diarrhea, 5-6 bowel movements daily x 2-3 days, nonbloody and mainly loose/watery, with some mucous passage. Unsure about IBD history in family. Reports travel in june to [place], however no change in baseline symptomatology during/after trip. No other symptoms reported- no dysuria, no cough, no chest pain.     Heme/Onc history: Patient was diagnosed with Type I Von Willebrand Disease in 2014 after a tonsillectomy where they found a clot in posterior pharynx after the procedure. Patient has had multiple surgeries that includes appendectomy and shoulder surgery, prior to which she received IV DDAVP. Currently follows Dr. Nelson's group practice as outpatient (ph: [phone]). Patient denied any complications after prior surgeries, denied requiring blood transfusions. Reports menorrhagia, currently menstruating started yesterday, usually last 6-7 days, not on OCP or iron supplements. Denied any family history of blood disorders.    REVIEW OF SYSTEMS:    CONSTITUTIONAL: No weakness, fevers or chills  EYES/ENT: No visual changes;  No vertigo or throat pain   NECK: No pain or stiffness  RESPIRATORY: No cough, wheezing, hemoptysis; No shortness of breath  CARDIOVASCULAR: No chest pain or palpitations  GASTROINTESTINAL: +low abdominal pain, + BRPR stool, No nausea, vomiting, or hematemesis; No diarrhea or constipation.   GENITOURINARY: No dysuria, frequency or hematuria  NEUROLOGICAL: No numbness or weakness  SKIN: No itching, burning, rashes, or lesions   All other review of systems is negative unless indicated above.    PAST MEDICAL & SURGICAL HISTORY:  Von Willebrand disease  History of tonsillectomy  History of appendectomy    FAMILY HISTORY:  Family history of irritable bowel syndrome (Mother)    SOCIAL HISTORY:  nonsmoker, occasional social drinker, no drugs, sexually active with boyfriend, uses condoms every time, no STD symptoms, [facility] Goes to [employer] [facility] [employer], lives in dorm, no sick contacts.    Allergies    oxycodone (Hives)  Seafood (Hives)    Objectives:    Vital Signs Last 24 Hrs  T(C): 36.8 (25 Oct 2017 12:08), Max: 36.8 (24 Oct 2017 17:31)  T(F): 98.3 (25 Oct 2017 12:08), Max: 98.3 (25 Oct 2017 12:08)  HR: 65 (25 Oct 2017 12:08) (65 - 75)  BP: 101/68 (25 Oct 2017 12:08) (98/59 - 110/72)  BP(mean): 84 (24 Oct 2017 15:13) (84 - 84)  RR: 18 (25 Oct 2017 12:08) (18 - 18)  SpO2: 98% (25 Oct 2017 12:08) (97% - 99%)    LABS:                        12.7   3.8   )-----------( 217      ( 25 Oct 2017 12:12 )             37.3     10-25    142  |  107  |  4<L>  ----------------------------<  89  4.1   |  24  |  0.62    Ca    8.8      25 Oct 2017 12:12    TPro  7.5  /  Alb  4.8  /  TBili  1.0  /  DBili  x   /  AST  16  /  ALT  11  /  AlkPhos  68  10-24    < from: CT Abdomen and Pelvis w/ Oral Cont and w/ IV Cont (10.24.17 @ 06:19) >  IMPRESSION:     Few thickened loops of small bowel seen within the lower abdomen, may be   seen in the setting of enteritis.    Fluid/heterogeneous material filling the cecum with intraluminal area of   ill-defined hyperattenuation, which may represent blood, however, area of   active extravasation cannot be excluded. Correlate for degree of   patient's bleeding per rectum. If clinical suspicion for active GI bleed   is high, further evaluation with bleeding scan may be obtained.     Status post appendectomy.    < end of copied text > HPI: 21f hx type I vwd, chronic abdominal pain x 1 year presenting with acute on chronic abdominal pain x 2-3 days. States she always has low level, diffusely crampy/dull abdominal pain which intermittently flares, but recently experienced worsening abdominal pain, mainly periumbilical which has been constant, rated 6-7/10 at its worst, associated with nausea and 1 episode of nbnb vomiting last night, anorexia without significant weight loss x several days, fevers with 100.5 being the highest. Also has had diarrhea, 5-6 bowel movements daily x 2-3 days, nonbloody and mainly loose/watery, with some mucous passage. Unsure about IBD history in family. Reports travel in june to [place], however no change in baseline symptomatology during/after trip. No other symptoms reported- no dysuria, no cough, no chest pain.     Heme/Onc history: Patient was diagnosed with Type I Von Willebrand Disease in 2014 after a tonsillectomy where they found a clot in posterior pharynx after the procedure. Patient has had multiple surgeries that includes appendectomy and shoulder surgery, prior to which she received IV DDAVP. Currently follows Dr. Nelson's group practice as outpatient (ph: [phone]). Patient denied any complications after prior surgeries, denied requiring blood transfusions. Reports menorrhagia, currently menstruating started yesterday, usually last 6-7 days, not on OCP or iron supplements. Denied any family history of blood disorders.    REVIEW OF SYSTEMS:    CONSTITUTIONAL: No weakness, fevers or chills  EYES/ENT: No visual changes;  No vertigo or throat pain   NECK: No pain or stiffness  RESPIRATORY: No cough, wheezing, hemoptysis; No shortness of breath  CARDIOVASCULAR: No chest pain or palpitations  GASTROINTESTINAL: +low abdominal pain, + BRPR stool, No nausea, vomiting, or hematemesis; No diarrhea or constipation.   GENITOURINARY: No dysuria, frequency or hematuria  NEUROLOGICAL: No numbness or weakness  SKIN: No itching, burning, rashes, or lesions   All other review of systems is negative unless indicated above.    PAST MEDICAL & SURGICAL HISTORY:  Von Willebrand disease  History of tonsillectomy  History of appendectomy    FAMILY HISTORY:  Family history of irritable bowel syndrome (Mother)    SOCIAL HISTORY:  nonsmoker, occasional social drinker, no drugs, sexually active with boyfriend, uses condoms every time, no STD symptoms, [facility] Goes to [employer], lives in dorm, no sick contacts.    Allergies    oxycodone (Hives)  Seafood (Hives)    Objectives:    Vital Signs Last 24 Hrs  T(C): 36.8 (25 Oct 2017 12:08), Max: 36.8 (24 Oct 2017 17:31)  T(F): 98.3 (25 Oct 2017 12:08), Max: 98.3 (25 Oct 2017 12:08)  HR: 65 (25 Oct 2017 12:08) (65 - 75)  BP: 101/68 (25 Oct 2017 12:08) (98/59 - 110/72)  BP(mean): 84 (24 Oct 2017 15:13) (84 - 84)  RR: 18 (25 Oct 2017 12:08) (18 - 18)  SpO2: 98% (25 Oct 2017 12:08) (97% - 99%)    PHYSICAL EXAM:  General: WN/WD NAD  Neurology: A&Ox3, nonfocal, PAUL x 4  Eyes: PERRLA/ EOMI, Gross vision intact  ENT/Neck: Neck supple, trachea midline, No JVD, Gross hearing intact  Respiratory: CTA B/L, No wheezing, rales, rhonchi  CV: RRR, S1S2, no murmurs, rubs or gallops  Abdominal: Soft, +RLQ, LLQ tenderness, ND +BS,   Extremities: No edema, + peripheral pulses  Skin: No Rashes, Hematoma, Ecchymosis    LABS:                        12.7   3.8   )-----------( 217      ( 25 Oct 2017 12:12 )             37.3     10-25    142  |  107  |  4<L>  ----------------------------<  89  4.1   |  24  |  0.62    Ca    8.8      25 Oct 2017 12:12    TPro  7.5  /  Alb  4.8  /  TBili  1.0  /  DBili  x   /  AST  16  /  ALT  11  /  AlkPhos  68  10-24    < from: CT Abdomen and Pelvis w/ Oral Cont and w/ IV Cont (10.24.17 @ 06:19) >  IMPRESSION:     Few thickened loops of small bowel seen within the lower abdomen, may be   seen in the setting of enteritis.    Fluid/heterogeneous material filling the cecum with intraluminal area of   ill-defined hyperattenuation, which may represent blood, however, area of   active extravasation cannot be excluded. Correlate for degree of   patient's bleeding per rectum. If clinical suspicion for active GI bleed   is high, further evaluation with bleeding scan may be obtained.     Status post appendectomy.    < end of copied text >

## 2017-10-25 NOTE — DISCHARGE NOTE ADULT - PATIENT PORTAL LINK FT
“You can access the [facility] Patient Portal, offered by [facility], by registering with the following website: [email or web address]”

## 2017-10-25 NOTE — DISCHARGE NOTE ADULT - HOSPITAL COURSE
21f hx as above presenting with acute on chronic abdominal pain s/p colonoscopy showing ileitis s/p biopsy   21f hx as above presenting with acute on chronic abdominal pain      Problem/Plan - 1:  ·  Problem: Abdominal pain.  Plan: History highly concerning for IBD (family history of IBD) vs. possible focal enteritis  - s/p colonoscopy with biopsy 10/25. f/u results   start mesalamine per gi  -  tylenol for breakthrough  - check stool studies- culture and wbcs, fecal calprotectin. No indication for cdiff at this time.  - serial abdominal exams  h/h stable. History highly concerning for IBD (family history of IBD) vs. possible focal enteritis  - colonoscopy today. npo for procedure s/p prep  f/u colonoscopy  - pain control with morphine 4mg iv q6 prn severe pain, tylenol for breakthrough  - check stool studies- culture and wbcs, fecal calprotectin. No indication for cdiff at this time.  - serial abdominal exams  GI bleed with bleeding in the cecum on ct a/p - slight drop in h/h - monitor      Problem/Plan - 2:  ·  Problem: Nausea & vomiting.  Plan: poor PO intake- likely 2/2 IBD vs. enteritis     still very nauseous - continue clear liquids for now. poor PO intake- likely 2/2 IBD vs. enteritis - no significant weight loss per patient  NPO pending colonscopy  - zofran PRN      Problem/Plan - 3:  ·  Problem: Von Willebrand disease.  Plan: No major bleed history.   heme/onc f/u appreciated   - monitor for signs and symptoms of bleeding. No major bleed history. 21f hx as above presenting with acute on chronic abdominal pain s/p colonoscopy showing ileitis s/p biopsy   21f hx as above presenting with acute on chronic abdominal pain      Problem/Plan - 1:  ·  Problem: Abdominal pain.  Plan: History highly concerning for IBD (family history of IBD) vs. possible focal enteritis  - s/p colonoscopy with biopsy 10/25. f/u results   start mesalamine per gi  -  tylenol for breakthrough  - check stool studies- culture and wbcs, fecal calprotectin. No indication for cdiff at this time.  - serial abdominal exams  h/h stable. History highly concerning for IBD (family history of IBD) vs. possible focal enteritis  - colonoscopy today. npo for procedure s/p prep  f/u colonoscopy  - pain control with morphine 4mg iv q6 prn severe pain, tylenol for breakthrough  - check stool studies- culture and wbcs, fecal calprotectin. No indication for cdiff at this time.  - serial abdominal exams  GI bleed with bleeding in the cecum on ct a/p - slight drop in h/h - monitor      Problem/Plan - 2:  ·  Problem: Nausea & vomiting.  Plan: poor PO intake- likely 2/2 IBD vs. enteritis     NPO pending colonscopy  - zofran PRN  Problem/Plan - 3:  ·  Problem: Von Willebrand disease.  Plan: No major bleed history.   heme/onc f/u appreciated   - monitor for signs and symptoms of bleeding. No major bleed history. 21f hx as above presenting with acute on chronic abdominal pain s/p colonoscopy showing ileitis s/p biopsy   21f hx as above presenting with acute on chronic abdominal pain      Problem/Plan - 1:  ·  Problem: Abdominal pain.  Plan: History highly concerning for IBD (family history of IBD) vs. possible focal enteritis  - s/p colonoscopy with biopsy 10/25. f/u results   start mesalamine per gi  -  tylenol for breakthrough  - check stool studies- culture and wbcs, fecal calprotectin. No indication for cdiff at this time.  - serial abdominal exams  h/h stable. History highly concerning for IBD (family history of IBD) vs. possible focal enteritis  - colonoscopy today. npo for procedure s/p prep  f/u colonoscopy  - pain control with morphine 4mg iv q6 prn severe pain, tylenol for breakthrough  - check stool studies- culture and wbcs, fecal calprotectin. No indication for cdiff at this time.  - serial abdominal exams  GI bleed with bleeding in the cecum on ct a/p - slight drop in h/h - monitor      Problem/Plan - 2:  ·  Problem: Nausea & vomiting.  Plan: poor PO intake- likely 2/2 IBD vs. enteritis       Tolerating regular diet    NPO pending colonscopy  - zofran PRN  Problem/Plan - 3:  ·  Problem: Von Willebrand disease.  Plan: No major bleed history.   heme/onc f/u appreciated   - monitor for signs and symptoms of bleeding. No major bleed history. 21f h/o von willebrands disease presenting with acute on chronic abdominal pain. Gi consulted. s/p colonoscopy (recieved ddavp prior to colonoscopy per hematology). colonoscpy showed ileitis s/p bi 21f hx as above presenting with acute on chronic abdominal pain   opsy. finding highly concerning for IBD (family history of IBD) started on mesalamine. initially requiried morphine iv prn. diet advanced as tolerated. not requiring further pain medication. stool cultures negative to date. patient to follow up with GI - Dr. Gutierrez next week for biopsy results. History highly concerning for IBD (family history of IBD) vs. possible focal enteritis  - colonoscopy today. npo for procedure s/p prep  f/u colonoscopy  - pain control with morphine 4mg iv q6 prn severe pain, tylenol for breakthrough  - check stool studies- culture and wbcs, fecal calprotectin. No indication for cdiff at this time.  - serial abdominal exams  GI bleed with bleeding in the cecum on ct a/p - slight drop in h/h - monitor   NPO pending colonscopy  - zofran PRN

## 2017-10-25 NOTE — DISCHARGE NOTE ADULT - CARE PLAN
Principal Discharge DX:	GI bleed  Goal:	resolved  Instructions for follow-up, activity and diet:	There are 2 common types of GI Bleed, Upper GI Bleed and Lower GI Bleed.  Upper GI Bleed affects the esophagus, stomach, and first part of the small intestine. Lower GI Bleed affects the colon and rectum.  Upper GI Bleed signs and symptoms to notify your Health Care Provider are vomiting blood, or coffee ground vomitus, and bowel movements that look like black tar.  Lower GI Bleed signs and symptoms to notify your health care provider are bright red bloody bowel movements.   Take your medications as prescribed by your Gastroenterologist.  If you have had an Endoscopy or Colonoscopy, follow up with your Gastroenterologist for Pathology results.  Avoid NSAIDs unless your Health Care Provider tells you that it is ok (Aspirin, Ibuprofen, Advil, Motrin, Aleve).  Follow up with your Gastroenterologist within 1-2 weeks of discharge.  Secondary Diagnosis:	Von Willebrand disease  Instructions for follow-up, activity and diet:	no treatment at thi time  follow up with Hematology Dr. Nelson  Secondary Diagnosis:	IBS (irritable bowel syndrome)  Instructions for follow-up, activity and diet:	continue mesalmine as directed  follow up with GI Dr. Gutierrez within 1 week after discharge  repeat colonoscopy in 6 months

## 2017-10-25 NOTE — PROGRESS NOTE ADULT - PROBLEM SELECTOR PLAN 1
History highly concerning for IBD vs. possible focal enteritis vs possible  - colonoscopy with GI tomorrow 10/25. Clear liquid diet as tolerated, followed by moviprep (GI to order), followed by NPO p MN.  - pain control with morphine 4mg iv q6 prn severe pain, tylenol for breakthrough  - check stool studies- culture and wbcs, fecal calprotectin. No indication for cdiff at this time.  - serial abdominal exams History highly concerning for IBD (family history of IBD) vs. possible focal enteritis  - colonoscopy today. npo for procedure s/p prep  f/u colonoscopy  - pain control with morphine 4mg iv q6 prn severe pain, tylenol for breakthrough  - check stool studies- culture and wbcs, fecal calprotectin. No indication for cdiff at this time.  - serial abdominal exams History highly concerning for IBD (family history of IBD) vs. possible focal enteritis  - colonoscopy today. npo for procedure s/p prep  f/u colonoscopy  - pain control with morphine 4mg iv q6 prn severe pain, tylenol for breakthrough  - check stool studies- culture and wbcs, fecal calprotectin. No indication for cdiff at this time.  - serial abdominal exams  blood in stool - slight drop in h/h - monitor History highly concerning for IBD (family history of IBD) vs. possible focal enteritis  - colonoscopy today. npo for procedure s/p prep  f/u colonoscopy  - pain control with morphine 4mg iv q6 prn severe pain, tylenol for breakthrough  - check stool studies- culture and wbcs, fecal calprotectin. No indication for cdiff at this time.  - serial abdominal exams  GI bleed with bleeding in the cecum on ct a/p - slight drop in h/h - monitor

## 2017-10-25 NOTE — DISCHARGE NOTE ADULT - PROVIDER TOKENS
TOKEN:'[id number]' TOKEN:'[id number]' TOKEN:'[id number]',FREE:[LAST:[Omar],FIRST:[],PHONE:[(   )    -],FAX:[(   )    -]],TOKEN:'[id number]'

## 2017-10-25 NOTE — PROGRESS NOTE ADULT - PROBLEM SELECTOR PLAN 2
with anorexia and poor PO intake- likely 2/2 IBD vs. enteritis  - IVF hydration NS bolus 1L  x 1, followed by 100cc/hr  - clear liquid diet as tolerated, NPO p MN and moviprep for colonoscopy  - zofran PRN, check baseline EKG poor PO intake- likely 2/2 IBD vs. enteritis - no significant weight loss per patient  NPO pending colonscopy  - zofran PRN

## 2017-10-25 NOTE — DISCHARGE NOTE ADULT - PLAN OF CARE
resolved There are 2 common types of GI Bleed, Upper GI Bleed and Lower GI Bleed.  Upper GI Bleed affects the esophagus, stomach, and first part of the small intestine. Lower GI Bleed affects the colon and rectum.  Upper GI Bleed signs and symptoms to notify your Health Care Provider are vomiting blood, or coffee ground vomitus, and bowel movements that look like black tar.  Lower GI Bleed signs and symptoms to notify your health care provider are bright red bloody bowel movements.   Take your medications as prescribed by your Gastroenterologist.  If you have had an Endoscopy or Colonoscopy, follow up with your Gastroenterologist for Pathology results.  Avoid NSAIDs unless your Health Care Provider tells you that it is ok (Aspirin, Ibuprofen, Advil, Motrin, Aleve).  Follow up with your Gastroenterologist within 1-2 weeks of discharge. no treatment at thi time  follow up with Hematology Dr. Nelson continue mesalmine as directed  follow up with GI Dr. Gutierrez within 1 week after discharge  repeat colonoscopy in 6 months

## 2017-10-25 NOTE — PROGRESS NOTE ADULT - PROBLEM SELECTOR PLAN 4
- ppx: hold hsq pending H/O recommendations  - diet: clears followed by moviprep and NPO p MN  - dispo: medical floor - ppx: hold hsq for now, pas in bed  - dispo: medical floor

## 2017-10-25 NOTE — DISCHARGE NOTE ADULT - MEDICATION SUMMARY - MEDICATIONS TO TAKE
I will START or STAY ON the medications listed below when I get home from the hospital:    mesalamine 500 mg oral capsule, extended release  -- 2 cap(s) by mouth 4 times a day  -- Indication: For ibs    acetaminophen 325 mg oral tablet  -- 2 tab(s) by mouth every 6 hours, As needed, Moderate Pain (4 - 6)  -- Indication: For Pain I will START or STAY ON the medications listed below when I get home from the hospital:    mesalamine 500 mg oral capsule, extended release  -- 2 cap(s) by mouth 4 times a day  -- Indication: For IBS (irritable bowel syndrome)    acetaminophen 325 mg oral tablet  -- 2 tab(s) by mouth every 6 hours, As needed, Moderate Pain (4 - 6)  -- Indication: For Pain

## 2017-10-25 NOTE — PROGRESS NOTE ADULT - PROBLEM SELECTOR PLAN 3
No major bleed history. Per patient, required transfusion of blood products 'for clotting,' prior to shoulder surgery in May 2017. No complications of surgery or anesthesia. Has OP H/O.  - pt is high bleed risk for low bleed risk procedure- consult H/O re: any further workup or pre-endoscopy treatment.  - check PT/PTT  - monitor for signs and symptoms of bleeding No major bleed history.   heme/onc consult appreciated -   - pt is high bleed risk for low risk procedure-ddavp 30 minutes prior to colonscopy  - monitor for signs and symptoms of bleeding

## 2017-10-25 NOTE — PROGRESS NOTE ADULT - SUBJECTIVE AND OBJECTIVE BOX
Pre-Endoscopy Evaluation      Referring Physician:   Dr. Ti Clemons                                 Procedure: Colonoscopy    Indication for Procedure: Acute on chronic abdominal, R/O IBD    Pertinent History: 21 year old female with hx of  chronic abdominal pain x 1 year presenting with acute on chronic abdominal pain x 2-3 days. States she always has low level, diffusely crampy/dull abdominal pain which intermittently flares, but recently experienced worsening abdominal pain, mainly periumbilical which has been constant, rated 6-7/10 at its worst, associated with nausea and 1 episode of vomiting     Sedation by Anesthesia [X]    PAST MEDICAL & SURGICAL HISTORY:  Von Willebrand disease  History of tonsillectomy  History of appendectomy      PMH of Gastroparesis [ ]  Gastric Surgery [ ]  Gastric Outlet Obstruction [ ]    Allergies:    oxycodone (Hives)  Seafood (Hives)    Intolerances:    Latex allergy: [ ] yes [X] no    Medications:MEDICATIONS  (STANDING):  influenza   Vaccine 0.5 milliLiter(s) IntraMuscular once  sodium chloride 0.9%. 1000 milliLiter(s) (100 mL/Hr) IV Continuous <Continuous>    MEDICATIONS  (PRN):  acetaminophen   Tablet. 650 milliGRAM(s) Oral every 6 hours PRN Moderate Pain (4 - 6)  morphine  - Injectable 4 milliGRAM(s) IV Push every 6 hours PRN Severe Pain (7 - 10)  ondansetron Injectable 4 milliGRAM(s) IV Push every 8 hours PRN Nausea and/or Vomiting      Smoking: [ ] yes  [X] no    AICD/PPM: [ ] yes   [X] no    Pertinent lab data:                        13.9   4.0   )-----------( 225      ( 24 Oct 2017 14:07 )             40.3     10-24    142  |  100  |  9   ----------------------------<  98  3.9   |  28  |  0.67    Ca    9.7      24 Oct 2017 03:49    TPro  7.5  /  Alb  4.8  /  TBili  1.0  /  DBili  x   /  AST  16  /  ALT  11  /  AlkPhos  68  10-24    PT/INR - ( 24 Oct 2017 16:20 )   PT: 12.6 sec;   INR: 1.15 ratio       PTT - ( 24 Oct 2017 16:20 )  PTT:32.4 sec    Pregnancy Profile, Urine (10.24.17 @ 21:15)    Pregnancy Profile, Urine: Negative:       Physical Examination:  Daily Height in cm: 165.1 (25 Oct 2017 06:50)    Daily   Vital Signs Last 24 Hrs  T(C): 36.3 (25 Oct 2017 04:12), Max: 36.8 (24 Oct 2017 17:31)  T(F): 97.4 (25 Oct 2017 04:12), Max: 98.2 (24 Oct 2017 17:31)  HR: 66 (25 Oct 2017 04:12) (66 - 75)  BP: 98/59 (25 Oct 2017 04:12) (98/59 - 110/72)  BP(mean): 84 (24 Oct 2017 15:13) (84 - 84)  RR: 18 (25 Oct 2017 04:12) (18 - 18)  SpO2: 97% (25 Oct 2017 04:12) (97% - 99%)      Constitutional: NAD    Neck:  No JVD    Respiratory: CTAB/L    Cardiovascular: S1 and S2    Gastrointestinal: BS+, soft, NT/ND    Extremities: No peripheral edema    Neurological: A/O x 3    : No Stone    Skin: No rashes    Comments:    ASA Class: I [X]  II [ ]  III [ ]  IV [ ]    The patient is a suitable candidate for the planned procedure unless box checked [ ]  No, explain: Pre-Endoscopy Evaluation      Referring Physician:   Dr. Ti Clemons                                 Procedure: Colonoscopy    Indication for Procedure: Acute on chronic abdominal, R/O IBD    Pertinent History: 21 year old female with hx of Von Willebrand Disease, Chronic abdominal pain x 1 year presenting with acute on chronic abdominal pain x 2-3 days. States she always has low level, diffusely crampy/dull abdominal pain which intermittently flares, but recently experienced worsening abdominal pain, mainly periumbilical which has been constant, rated 6-7/10 at its worst, associated with nausea and 1 episode of vomiting     Sedation by Anesthesia [X]    PAST MEDICAL & SURGICAL HISTORY:  Von Willebrand disease  History of tonsillectomy  History of appendectomy      PMH of Gastroparesis [ ]  Gastric Surgery [ ]  Gastric Outlet Obstruction [ ]    Allergies:    oxycodone (Hives)  Seafood (Hives)    Intolerances:    Latex allergy: [ ] yes [X] no    Medications:MEDICATIONS  (STANDING):  influenza   Vaccine 0.5 milliLiter(s) IntraMuscular once  sodium chloride 0.9%. 1000 milliLiter(s) (100 mL/Hr) IV Continuous <Continuous>    MEDICATIONS  (PRN):  acetaminophen   Tablet. 650 milliGRAM(s) Oral every 6 hours PRN Moderate Pain (4 - 6)  morphine  - Injectable 4 milliGRAM(s) IV Push every 6 hours PRN Severe Pain (7 - 10)  ondansetron Injectable 4 milliGRAM(s) IV Push every 8 hours PRN Nausea and/or Vomiting      Smoking: [ ] yes  [X] no    AICD/PPM: [ ] yes   [X] no    Pertinent lab data:                        13.9   4.0   )-----------( 225      ( 24 Oct 2017 14:07 )             40.3     10-24    142  |  100  |  9   ----------------------------<  98  3.9   |  28  |  0.67    Ca    9.7      24 Oct 2017 03:49    TPro  7.5  /  Alb  4.8  /  TBili  1.0  /  DBili  x   /  AST  16  /  ALT  11  /  AlkPhos  68  10-24    PT/INR - ( 24 Oct 2017 16:20 )   PT: 12.6 sec;   INR: 1.15 ratio       PTT - ( 24 Oct 2017 16:20 )  PTT:32.4 sec    Pregnancy Profile, Urine (10.24.17 @ 21:15)    Pregnancy Profile, Urine: Negative:       Physical Examination:  Daily Height in cm: 165.1 (25 Oct 2017 06:50)    Daily   Vital Signs Last 24 Hrs  T(C): 36.3 (25 Oct 2017 04:12), Max: 36.8 (24 Oct 2017 17:31)  T(F): 97.4 (25 Oct 2017 04:12), Max: 98.2 (24 Oct 2017 17:31)  HR: 66 (25 Oct 2017 04:12) (66 - 75)  BP: 98/59 (25 Oct 2017 04:12) (98/59 - 110/72)  BP(mean): 84 (24 Oct 2017 15:13) (84 - 84)  RR: 18 (25 Oct 2017 04:12) (18 - 18)  SpO2: 97% (25 Oct 2017 04:12) (97% - 99%)      Constitutional: NAD    Neck:  No JVD    Respiratory: CTAB/L    Cardiovascular: S1 and S2    Gastrointestinal: BS+, soft, NT/ND    Extremities: No peripheral edema    Neurological: A/O x 3    : No Stone    Skin: No rashes    Comments:    ASA Class: I [ ]  II [ ]  III [X]  IV [ ]    The patient is a suitable candidate for the planned procedure unless box checked [ ]  No, explain:

## 2017-10-25 NOTE — CONSULT NOTE ADULT - ASSESSMENT
21f hx type I vwd, chronic abdominal pain x 1 year presenting with acute on chronic abdominal pain x 2-3 days, undergoing colonoscopy today.    # Type 1 Von Willebrand disease  - high risk bleeding for low risk procedure  - responded well with IV DDAVP in prior surgeries  - Give 21 mcg IV DDAVP 30 mins prior to GI procedure (can infuse over 15-30 mins)    Attending note to follow.    Reina Jang PGY-1  Pager # [phone]/ [phone] 21f hx type I vwd, chronic abdominal pain x 1 year presenting with acute on chronic abdominal pain x 2-3 days, undergoing colonoscopy today.    # Type 1 Von Willebrand disease  - high risk bleeding for low risk procedure  - responded well with IV DDAVP in prior surgeries  - Give 21 mcg IV DDAVP 30 mins prior to GI procedure (can infuse over 15-30 mins)  - Type O blood group also associated with lower levels of Von Willebrand factor    Attending note to follow.    Reina Jang PGY-1  Pager # [phone]/ [phone]

## 2017-10-25 NOTE — DISCHARGE NOTE ADULT - CARE PROVIDERS DIRECT ADDRESSES
[email or web address],DirectAddress_Unknown,DirectAddress_Unknown

## 2017-10-26 LAB
CULTURE RESULTS: SIGNIFICANT CHANGE UP
HCT VFR BLD CALC: 36.8 % — SIGNIFICANT CHANGE UP (ref 34.5–45)
HGB BLD-MCNC: 13 G/DL — SIGNIFICANT CHANGE UP (ref 11.5–15.5)
MCHC RBC-ENTMCNC: 31 PG — SIGNIFICANT CHANGE UP (ref 27–34)
MCHC RBC-ENTMCNC: 35.2 GM/DL — SIGNIFICANT CHANGE UP (ref 32–36)
MCV RBC AUTO: 88 FL — SIGNIFICANT CHANGE UP (ref 80–100)
PLATELET # BLD AUTO: 242 K/UL — SIGNIFICANT CHANGE UP (ref 150–400)
RBC # BLD: 4.18 M/UL — SIGNIFICANT CHANGE UP (ref 3.8–5.2)
RBC # FLD: 10.7 % — SIGNIFICANT CHANGE UP (ref 10.3–14.5)
SPECIMEN SOURCE: SIGNIFICANT CHANGE UP
WBC # BLD: 4.1 K/UL — SIGNIFICANT CHANGE UP (ref 3.8–10.5)
WBC # FLD AUTO: 4.1 K/UL — SIGNIFICANT CHANGE UP (ref 3.8–10.5)

## 2017-10-26 PROCEDURE — 99233 SBSQ HOSP IP/OBS HIGH 50: CPT

## 2017-10-26 PROCEDURE — 99222 1ST HOSP IP/OBS MODERATE 55: CPT | Mod: GC

## 2017-10-26 RX ORDER — MESALAMINE 400 MG
1000 TABLET, DELAYED RELEASE (ENTERIC COATED) ORAL
Qty: 0 | Refills: 0 | Status: DISCONTINUED | OUTPATIENT
Start: 2017-10-26 | End: 2017-10-27

## 2017-10-26 RX ORDER — METOCLOPRAMIDE HCL 10 MG
10 TABLET ORAL ONCE
Qty: 0 | Refills: 0 | Status: COMPLETED | OUTPATIENT
Start: 2017-10-26 | End: 2017-10-26

## 2017-10-26 RX ORDER — SOD,AMMONIUM,POTASSIUM LACTATE
1 CREAM (GRAM) TOPICAL
Qty: 0 | Refills: 0 | COMMUNITY

## 2017-10-26 RX ORDER — CLINDAMYCIN PHOSPHATE GEL USP, 1% 10 MG/G
1 GEL TOPICAL
Qty: 0 | Refills: 0 | COMMUNITY

## 2017-10-26 RX ORDER — MORPHINE SULFATE 50 MG/1
2 CAPSULE, EXTENDED RELEASE ORAL EVERY 6 HOURS
Qty: 0 | Refills: 0 | Status: DISCONTINUED | OUTPATIENT
Start: 2017-10-26 | End: 2017-10-27

## 2017-10-26 RX ORDER — ACETAMINOPHEN 500 MG
2 TABLET ORAL
Qty: 0 | Refills: 0 | COMMUNITY
Start: 2017-10-26

## 2017-10-26 RX ORDER — SODIUM CHLORIDE 9 MG/ML
1000 INJECTION INTRAMUSCULAR; INTRAVENOUS; SUBCUTANEOUS
Qty: 0 | Refills: 0 | Status: DISCONTINUED | OUTPATIENT
Start: 2017-10-26 | End: 2017-10-27

## 2017-10-26 RX ORDER — SODIUM CHLORIDE 9 MG/ML
500 INJECTION INTRAMUSCULAR; INTRAVENOUS; SUBCUTANEOUS ONCE
Qty: 0 | Refills: 0 | Status: COMPLETED | OUTPATIENT
Start: 2017-10-26 | End: 2017-10-26

## 2017-10-26 RX ORDER — ONDANSETRON 8 MG/1
4 TABLET, FILM COATED ORAL EVERY 6 HOURS
Qty: 0 | Refills: 0 | Status: DISCONTINUED | OUTPATIENT
Start: 2017-10-26 | End: 2017-10-27

## 2017-10-26 RX ORDER — BENZOYL PEROXIDE 50 MG/ML
1 GEL TOPICAL
Qty: 0 | Refills: 0 | COMMUNITY

## 2017-10-26 RX ADMIN — MORPHINE SULFATE 4 MILLIGRAM(S): 50 CAPSULE, EXTENDED RELEASE ORAL at 01:03

## 2017-10-26 RX ADMIN — Medication 650 MILLIGRAM(S): at 14:49

## 2017-10-26 RX ADMIN — ONDANSETRON 4 MILLIGRAM(S): 8 TABLET, FILM COATED ORAL at 19:34

## 2017-10-26 RX ADMIN — Medication 1000 MILLIGRAM(S): at 23:04

## 2017-10-26 RX ADMIN — Medication 650 MILLIGRAM(S): at 09:10

## 2017-10-26 RX ADMIN — MORPHINE SULFATE 4 MILLIGRAM(S): 50 CAPSULE, EXTENDED RELEASE ORAL at 00:44

## 2017-10-26 RX ADMIN — Medication 1000 MILLIGRAM(S): at 12:23

## 2017-10-26 RX ADMIN — ONDANSETRON 4 MILLIGRAM(S): 8 TABLET, FILM COATED ORAL at 09:03

## 2017-10-26 RX ADMIN — Medication 1000 MILLIGRAM(S): at 17:29

## 2017-10-26 RX ADMIN — MORPHINE SULFATE 2 MILLIGRAM(S): 50 CAPSULE, EXTENDED RELEASE ORAL at 19:34

## 2017-10-26 RX ADMIN — Medication 10 MILLIGRAM(S): at 16:01

## 2017-10-26 RX ADMIN — MORPHINE SULFATE 2 MILLIGRAM(S): 50 CAPSULE, EXTENDED RELEASE ORAL at 20:04

## 2017-10-26 RX ADMIN — Medication 650 MILLIGRAM(S): at 08:36

## 2017-10-26 RX ADMIN — SODIUM CHLORIDE 75 MILLILITER(S): 9 INJECTION INTRAMUSCULAR; INTRAVENOUS; SUBCUTANEOUS at 16:00

## 2017-10-26 RX ADMIN — Medication 650 MILLIGRAM(S): at 00:02

## 2017-10-26 RX ADMIN — Medication 650 MILLIGRAM(S): at 15:20

## 2017-10-26 RX ADMIN — SODIUM CHLORIDE 500 MILLILITER(S): 9 INJECTION INTRAMUSCULAR; INTRAVENOUS; SUBCUTANEOUS at 12:58

## 2017-10-26 NOTE — PROGRESS NOTE ADULT - SUBJECTIVE AND OBJECTIVE BOX
Patient is a 21y old  Female who presents with a chief complaint of Gastrointestinal hemorrhage (25 Oct 2017 10:22)      SUBJECTIVE / OVERNIGHT EVENTS: No events overnight. Reports menorrhagia, headache, weakness, feeling bloated and uncomfortable, and nauseated. Denied epistaxis, gum bleeds, palpitations.     ROS: negative unless mentioned in HPI    MEDICATIONS  (STANDING):  influenza   Vaccine 0.5 milliLiter(s) IntraMuscular once  mesalamine ER Capsule 1000 milliGRAM(s) Oral four times a day  sodium chloride 0.9%. 1000 milliLiter(s) (100 mL/Hr) IV Continuous <Continuous>    MEDICATIONS  (PRN):  acetaminophen   Tablet. 650 milliGRAM(s) Oral every 6 hours PRN Moderate Pain (4 - 6)  morphine  - Injectable 4 milliGRAM(s) IV Push every 6 hours PRN Severe Pain (7 - 10)  ondansetron Injectable 4 milliGRAM(s) IV Push every 8 hours PRN Nausea and/or Vomiting    OBJECTIVES:    Vital Signs Last 24 Hrs  T(C): 36.4 (26 Oct 2017 13:44), Max: 36.7 (25 Oct 2017 21:02)  T(F): 97.6 (26 Oct 2017 13:44), Max: 98.1 (25 Oct 2017 21:02)  HR: 68 (26 Oct 2017 13:44) (68 - 79)  BP: 115/80 (26 Oct 2017 13:44) (96/65 - 115/80)  BP(mean): --  RR: 18 (26 Oct 2017 13:44) (17 - 18)  SpO2: 100% (26 Oct 2017 13:44) (98% - 100%)    I&O's Summary    25 Oct 2017 07:01  -  26 Oct 2017 07:00  --------------------------------------------------------  IN: 0 mL / OUT: 0 mL / NET: 0 mL    26 Oct 2017 07:01  -  26 Oct 2017 14:31  --------------------------------------------------------  IN: 480 mL / OUT: 250 mL / NET: 230 mL    PHYSICAL EXAM:  General: WN/WD NAD  Neurology: A&Ox3, nonfocal, PAUL x 4  Eyes: PERRLA/ EOMI, Gross vision intact  ENT/Neck: Neck supple, trachea midline, No JVD, Gross hearing intact  Respiratory: CTA B/L, No wheezing, rales, rhonchi  CV: RRR, S1S2, no murmurs, rubs or gallops  Abdominal: Soft, +RLQ, LLQ tenderness, ND +BS,   Extremities: No edema, + peripheral pulses  Skin: No Rashes, Hematoma, Ecchymosis    LABS:                        13.0   4.1   )-----------( 242      ( 26 Oct 2017 13:41 )             36.8     10-25    142  |  107  |  4<L>  ----------------------------<  89  4.1   |  24  |  0.62    Ca    8.8      25 Oct 2017 12:12      PT/INR - ( 24 Oct 2017 16:20 )   PT: 12.6 sec;   INR: 1.15 ratio         PTT - ( 24 Oct 2017 16:20 )  PTT:32.4 sec      RADIOLOGY & ADDITIONAL TESTS:    < from: Colonoscopy (10.25.17 @ 17:15) >         Findings:       The perianal and digital rectal examinations were normal.       The colon (entire examined portion) appeared normal. Biopsies were taken with a cold forceps        for histology.       Diffuse inflammation, moderate in severityand characterized by aphthous ulcerations was        found in the terminal ileum. Biopsies were taken with a cold forceps for histology.                                                                                                        Impression:          - The entire examined colon is normal. Biopsied.                       - Ileitis, rule out Crohn's disease. Biopsied.  Recommendation:      - [facility].                       - Advance diet as tolerated.                       - Await pathology results.                       - Start Mesalamine 1gm four times a day.                       - Patient should return to GI clinic in 1-2 weeks.                       - Repeat colonoscopy in 6 months for surveillance.                                                                                                          < end of copied text >      Care Discussed with Consultants/Other Providers: Dr. Lizarraga

## 2017-10-26 NOTE — PROGRESS NOTE ADULT - ASSESSMENT
21f hx as above presenting with acute on chronic abdominal pain 21f hx as above presenting with acute on chronic abdominal pain s/p colonoscopy showing ileitis s/p biopsy

## 2017-10-26 NOTE — PROGRESS NOTE ADULT - PROBLEM SELECTOR PLAN 3
No major bleed history.   heme/onc consult appreciated -   - pt is high bleed risk for low risk procedure-ddavp 30 minutes prior to colonscopy  - monitor for signs and symptoms of bleeding No major bleed history.   heme/onc f/u appreciated   - monitor for signs and symptoms of bleeding

## 2017-10-26 NOTE — PROGRESS NOTE ADULT - ASSESSMENT
Impression:   - Abdominal pain with passage of mucus and blood in stool and episodes of diarrhea, s/p colonoscopy on 10.25     Plan:   - monitor CBC, transfuse as needed  - send stool studies   - plan for colonoscopy on 10.25  - clear liquid diet today, moviprep in the evening today (GI fellow will order it) and npo after midnight  - supportive care as per primary team    GI team will continue to follow  Thank you for the consult. Impression:   - Abdominal pain with passage of mucus and blood in stool and episodes of diarrhea, s/p colonoscopy on 10.25     Plan:   - monitor CBC, transfuse as needed  - pending stool studies   - please start mesalamine 1gm four time a day  - follow pathology results  - repeat colonoscopy in 6 months for surveillance  - supportive care as per primary team  - patient will need to follow up with Dr. Nahum Gutierrez (GI) on discharge.     GI team will continue to follow  Thank you for the consult.

## 2017-10-26 NOTE — PROGRESS NOTE ADULT - PROBLEM SELECTOR PLAN 2
poor PO intake- likely 2/2 IBD vs. enteritis - no significant weight loss per patient  NPO pending colonscopy  - zofran PRN poor PO intake- likely 2/2 IBD vs. enteritis   zofran PRN  still very nauseous - continue clear liquids for now

## 2017-10-26 NOTE — PROGRESS NOTE ADULT - PROBLEM SELECTOR PLAN 1
History highly concerning for IBD (family history of IBD) vs. possible focal enteritis  - colonoscopy today. npo for procedure s/p prep  f/u colonoscopy  - pain control with morphine 4mg iv q6 prn severe pain, tylenol for breakthrough  - check stool studies- culture and wbcs, fecal calprotectin. No indication for cdiff at this time.  - serial abdominal exams  GI bleed with bleeding in the cecum on ct a/p - slight drop in h/h - monitor History highly concerning for IBD (family history of IBD) vs. possible focal enteritis  - s/p colonoscopy with biopsy 10/25. f/u results   start mesalamine per gi  - pain control with morphine 4mg iv q6 prn severe pain, tylenol for breakthrough  - check stool studies- culture and wbcs, fecal calprotectin. No indication for cdiff at this time.  - serial abdominal exams  h/h stable

## 2017-10-26 NOTE — PROGRESS NOTE ADULT - SUBJECTIVE AND OBJECTIVE BOX
Patient is a 21y old  Female who presents with a chief complaint of Gastrointestinal hemorrhage (25 Oct 2017 10:22)        SUBJECTIVE / OVERNIGHT EVENTS: c/o abdominal pain and nausea. +blood in toilet bowel with bowel movements but is menstruating at this time.       MEDICATIONS  (STANDING):  influenza   Vaccine 0.5 milliLiter(s) IntraMuscular once  mesalamine ER Capsule 1000 milliGRAM(s) Oral four times a day  metoclopramide Injectable 10 milliGRAM(s) IV Push once  sodium chloride 0.9%. 1000 milliLiter(s) (75 mL/Hr) IV Continuous <Continuous>    MEDICATIONS  (PRN):  acetaminophen   Tablet. 650 milliGRAM(s) Oral every 6 hours PRN Moderate Pain (4 - 6)  ondansetron Injectable 4 milliGRAM(s) IV Push every 6 hours PRN Nausea and/or Vomiting      Vital Signs Last 24 Hrs  T(C): 36.4 (26 Oct 2017 13:44), Max: 36.7 (25 Oct 2017 21:02)  T(F): 97.6 (26 Oct 2017 13:44), Max: 98.1 (25 Oct 2017 21:02)  HR: 68 (26 Oct 2017 13:44) (68 - 79)  BP: 115/80 (26 Oct 2017 13:44) (96/65 - 115/80)  BP(mean): --  RR: 18 (26 Oct 2017 13:44) (17 - 18)  SpO2: 100% (26 Oct 2017 13:44) (98% - 100%)  CAPILLARY BLOOD GLUCOSE        I&O's Summary    25 Oct 2017 07:01  -  26 Oct 2017 07:00  --------------------------------------------------------  IN: 0 mL / OUT: 0 mL / NET: 0 mL    26 Oct 2017 07:01  -  26 Oct 2017 16:00  --------------------------------------------------------  IN: 980 mL / OUT: 250 mL / NET: 730 mL        PHYSICAL EXAM:  GENERAL: NAD  HEAD:  Atraumatic, Normocephalic  EYES: conjunctiva and sclera clear  NECK: No JVD  CHEST/LUNG: CTA b/l  HEART: S1 S2 RRR  ABDOMEN: +BS Soft, diffuse tenderness - worse in LUQ and mid abd  EXTREMITIES:  2+ DP Pulses, No c/c/e  NEUROLOGY: AAOx3, no focal deficits   SKIN: No rashes or lesions    LABS:                        13.0   4.1   )-----------( 242      ( 26 Oct 2017 13:41 )             36.8     10-25    142  |  107  |  4<L>  ----------------------------<  89  4.1   |  24  |  0.62    Ca    8.8      25 Oct 2017 12:12      PT/INR - ( 24 Oct 2017 16:20 )   PT: 12.6 sec;   INR: 1.15 ratio         PTT - ( 24 Oct 2017 16:20 )  PTT:32.4 sec          RADIOLOGY & ADDITIONAL TESTS:    Imaging Personally Reviewed:  Consultant(s) Notes Reviewed: GI    Care Discussed with Consultants/Other Providers: d/w hematology - Dr. Jang

## 2017-10-26 NOTE — PROGRESS NOTE ADULT - SUBJECTIVE AND OBJECTIVE BOX
Chief Complaint:  Patient is a 21y old  Female who presents with a chief complaint of Gastrointestinal hemorrhage (25 Oct 2017 10:22)      HPI:    Allergies:  oxycodone (Hives)  Seafood (Hives)      Home Medications:    Hospital Medications:  acetaminophen   Tablet. 650 milliGRAM(s) Oral every 6 hours PRN  influenza   Vaccine 0.5 milliLiter(s) IntraMuscular once  morphine  - Injectable 4 milliGRAM(s) IV Push every 6 hours PRN  ondansetron Injectable 4 milliGRAM(s) IV Push every 8 hours PRN  sodium chloride 0.9%. 1000 milliLiter(s) IV Continuous <Continuous>      PMHX/PSHX:  Von Willebrand disease  History of tonsillectomy  History of appendectomy      Family history:  Family history of irritable bowel syndrome (Mother)      Social History:     ROS:     General:  No wt loss, fevers, chills, night sweats, fatigue,   Eyes:  Good vision, no reported pain  ENT:  No sore throat, pain, runny nose, dysphagia  CV:  No pain, palpitations, hypo/hypertension  Resp:  No dyspnea, cough, tachypnea, wheezing  GI:  See HPI  :  No pain, bleeding, incontinence, nocturia  Muscle:  No pain, weakness  Neuro:  No weakness, tingling, memory problems  Psych:  No fatigue, insomnia, mood problems, depression  Endocrine:  No polyuria, polydipsia, cold/heat intolerance  Heme:  No petechiae, ecchymosis, easy bruisability  Skin:  No rash, edema      PHYSICAL EXAM:     GENERAL:  Appears stated age, well-groomed, well-nourished, no distress  HEENT:  NC/AT,  conjunctivae clear and pink,  no JVD  CHEST:  Full & symmetric excursion, no increased effort, breath sounds clear  HEART:  Regular rhythm, S1, S2, no murmur/rub/S3/S4, no abdominal bruit, no edema  ABDOMEN:  Soft, non-tender, non-distended, normoactive bowel sounds,  no masses ,  EXTREMITIES:  no cyanosis,clubbing or edema  SKIN:  No rash/erythema/ecchymoses/petechiae/wounds/abscess/warm/dry  NEURO:  Alert, oriented    Vital Signs:  Vital Signs Last 24 Hrs  T(C): 36.6 (26 Oct 2017 04:20), Max: 36.8 (25 Oct 2017 12:08)  T(F): 97.9 (26 Oct 2017 04:20), Max: 98.3 (25 Oct 2017 12:08)  HR: 78 (26 Oct 2017 04:20) (65 - 78)  BP: 96/65 (26 Oct 2017 04:20) (96/65 - 114/78)  BP(mean): --  RR: 18 (26 Oct 2017 04:20) (18 - 18)  SpO2: 98% (26 Oct 2017 04:20) (98% - 98%)  Daily     Daily     LABS:                        12.7   3.8   )-----------( 217      ( 25 Oct 2017 12:12 )             37.3     10-25    142  |  107  |  4<L>  ----------------------------<  89  4.1   |  24  |  0.62    Ca    8.8      25 Oct 2017 12:12        PT/INR - ( 24 Oct 2017 16:20 )   PT: 12.6 sec;   INR: 1.15 ratio         PTT - ( 24 Oct 2017 16:20 )  PTT:32.4 sec        Imaging: Chief Complaint:  Patient is a 21y old  Female who presents with a chief complaint of Gastrointestinal hemorrhage (25 Oct 2017 10:22)      Interval Events:     Allergies:  oxycodone (Hives)  Seafood (Hives)      Hospital Medications:  acetaminophen   Tablet. 650 milliGRAM(s) Oral every 6 hours PRN  influenza   Vaccine 0.5 milliLiter(s) IntraMuscular once  morphine  - Injectable 4 milliGRAM(s) IV Push every 6 hours PRN  ondansetron Injectable 4 milliGRAM(s) IV Push every 8 hours PRN  sodium chloride 0.9%. 1000 milliLiter(s) IV Continuous <Continuous>      PMHX/PSHX:  Von Willebrand disease  History of tonsillectomy  History of appendectomy      Family history:  Family history of irritable bowel syndrome (Mother)      ROS:     General:  No wt loss, fevers, chills, night sweats, fatigue,   Eyes:  Good vision, no reported pain  ENT:  No sore throat, pain, runny nose, dysphagia  CV:  No pain, palpitations, hypo/hypertension  Resp:  No dyspnea, cough, tachypnea, wheezing  GI:  See HPI  :  No pain, bleeding, incontinence, nocturia  Muscle:  No pain, weakness  Neuro:  No weakness, tingling, memory problems  Psych:  No fatigue, insomnia, mood problems, depression  Endocrine:  No polyuria, polydipsia, cold/heat intolerance  Heme:  No petechiae, ecchymosis, easy bruisability  Skin:  No rash, edema      PHYSICAL EXAM:     GENERAL:  Appears stated age, well-groomed, well-nourished, no distress  HEENT:  NC/AT,  conjunctivae clear, sclera -anicteric  CHEST:  Full & symmetric excursion, no increased effort, breath sounds clear  HEART:  Regular rhythm, S1, S2, no murmur/rub/S3/S4,  no edema  ABDOMEN:  Soft, non-tender, non-distended, normoactive bowel sounds,  no masses ,no hepato-splenomegaly,   EXTREMITIES:  no cyanosis,clubbing or edema  SKIN:  No rash/erythema/ecchymoses/petechiae/wounds/abscess/warm/dry  NEURO:  Alert, oriented    Vital Signs:  Vital Signs Last 24 Hrs  T(C): 36.6 (26 Oct 2017 04:20), Max: 36.8 (25 Oct 2017 12:08)  T(F): 97.9 (26 Oct 2017 04:20), Max: 98.3 (25 Oct 2017 12:08)  HR: 78 (26 Oct 2017 04:20) (65 - 78)  BP: 96/65 (26 Oct 2017 04:20) (96/65 - 114/78)  BP(mean): --  RR: 18 (26 Oct 2017 04:20) (18 - 18)  SpO2: 98% (26 Oct 2017 04:20) (98% - 98%)  Daily     Daily     LABS:                        12.7   3.8   )-----------( 217      ( 25 Oct 2017 12:12 )             37.3     10-25    142  |  107  |  4<L>  ----------------------------<  89  4.1   |  24  |  0.62    Ca    8.8      25 Oct 2017 12:12        PT/INR - ( 24 Oct 2017 16:20 )   PT: 12.6 sec;   INR: 1.15 ratio         PTT - ( 24 Oct 2017 16:20 )  PTT:32.4 sec        Imaging: Chief Complaint:  Patient is a 21y old  Female who presents with a chief complaint of Gastrointestinal hemorrhage (25 Oct 2017 10:22)      Interval Events:   Patient had a colonoscopy yest and tolerated the procedure well.     Allergies:  oxycodone (Hives)  Seafood (Hives)      Hospital Medications:  acetaminophen   Tablet. 650 milliGRAM(s) Oral every 6 hours PRN  influenza   Vaccine 0.5 milliLiter(s) IntraMuscular once  morphine  - Injectable 4 milliGRAM(s) IV Push every 6 hours PRN  ondansetron Injectable 4 milliGRAM(s) IV Push every 8 hours PRN  sodium chloride 0.9%. 1000 milliLiter(s) IV Continuous <Continuous>      PMHX/PSHX:  Von Willebrand disease  History of tonsillectomy  History of appendectomy      Family history:  Family history of irritable bowel syndrome (Mother)      ROS:     General:  No wt loss, fevers, chills, night sweats, fatigue,   Eyes:  Good vision, no reported pain  ENT:  No sore throat, pain, runny nose, dysphagia  CV:  No pain, palpitations, hypo/hypertension  Resp:  No dyspnea, cough, tachypnea, wheezing  GI:  See HPI  :  No pain, bleeding, incontinence, nocturia  Muscle:  No pain, weakness  Neuro:  No weakness, tingling, memory problems  Psych:  No fatigue, insomnia, mood problems, depression  Endocrine:  No polyuria, polydipsia, cold/heat intolerance  Heme:  No petechiae, ecchymosis, easy bruisability  Skin:  No rash, edema      PHYSICAL EXAM:     GENERAL:  Appears stated age, well-groomed, well-nourished, no distress  HEENT:  NC/AT,  conjunctivae clear, sclera -anicteric  CHEST:  Full & symmetric excursion, no increased effort, breath sounds clear  HEART:  Regular rhythm, S1, S2, no murmur/rub/S3/S4,  no edema  ABDOMEN:  Soft, non-tender, non-distended, normoactive bowel sounds,  no masses ,no hepato-splenomegaly,   EXTREMITIES:  no cyanosis,clubbing or edema  SKIN:  No rash/erythema/ecchymoses/petechiae/wounds/abscess/warm/dry  NEURO:  Alert, oriented    Vital Signs:  Vital Signs Last 24 Hrs  T(C): 36.6 (26 Oct 2017 04:20), Max: 36.8 (25 Oct 2017 12:08)  T(F): 97.9 (26 Oct 2017 04:20), Max: 98.3 (25 Oct 2017 12:08)  HR: 78 (26 Oct 2017 04:20) (65 - 78)  BP: 96/65 (26 Oct 2017 04:20) (96/65 - 114/78)  BP(mean): --  RR: 18 (26 Oct 2017 04:20) (18 - 18)  SpO2: 98% (26 Oct 2017 04:20) (98% - 98%)  Daily     Daily     LABS:                        12.7   3.8   )-----------( 217      ( 25 Oct 2017 12:12 )             37.3     10-25    142  |  107  |  4<L>  ----------------------------<  89  4.1   |  24  |  0.62    Ca    8.8      25 Oct 2017 12:12        PT/INR - ( 24 Oct 2017 16:20 )   PT: 12.6 sec;   INR: 1.15 ratio         PTT - ( 24 Oct 2017 16:20 )  PTT:32.4 sec        Imaging:      < from: Colonoscopy (10.25.17 @ 17:15) >         Findings:       The perianal and digital rectal examinations were normal.       The colon (entire examined portion) appeared normal. Biopsies were taken with a cold forceps        for histology.       Diffuse inflammation, moderate in severityand characterized by aphthous ulcerations was        found in the terminal ileum. Biopsies were taken with a cold forceps for histology.                                                                                                        Impression:          - The entire examined colon is normal. Biopsied.                       - Ileitis, rule out Crohn's disease. Biopsied.  Recommendation:      - [facility].                       - Advance diet as tolerated.                       - Await pathology results.                       - Start Mesalamine 1gm four times a day.                       - Patient should return to GI clinic in 1-2 weeks.                       - Repeat colonoscopy in 6 months for surveillance.    < end of copied text > Chief Complaint:  Patient is a 21y old  Female who presents with a chief complaint of Gastrointestinal hemorrhage (25 Oct 2017 10:22)      Interval Events:   Patient had a colonoscopy yest and tolerated the procedure well.   Today she complains of headache, nausea and mild abd pain.  Denies vomiting.     Allergies:  oxycodone (Hives)  Seafood (Hives)      Hospital Medications:  acetaminophen   Tablet. 650 milliGRAM(s) Oral every 6 hours PRN  influenza   Vaccine 0.5 milliLiter(s) IntraMuscular once  morphine  - Injectable 4 milliGRAM(s) IV Push every 6 hours PRN  ondansetron Injectable 4 milliGRAM(s) IV Push every 8 hours PRN  sodium chloride 0.9%. 1000 milliLiter(s) IV Continuous <Continuous>      PMHX/PSHX:  Von Willebrand disease  History of tonsillectomy  History of appendectomy      Family history:  Family history of irritable bowel syndrome (Mother)      ROS:     General:  No wt loss, fevers, chills, night sweats, fatigue,   Eyes:  Good vision, no reported pain  ENT:  No sore throat, pain, runny nose, dysphagia  CV:  No pain, palpitations, hypo/hypertension  Resp:  No dyspnea, cough, tachypnea, wheezing  GI:  See HPI  :  No pain, bleeding, incontinence, nocturia  Muscle:  No pain, weakness  Neuro:  No weakness, tingling, memory problems  Psych:  No fatigue, insomnia, mood problems, depression  Endocrine:  No polyuria, polydipsia, cold/heat intolerance  Heme:  No petechiae, ecchymosis, easy bruisability  Skin:  No rash, edema      PHYSICAL EXAM:     GENERAL:  Appears stated age, well-groomed, well-nourished, no distress  HEENT:  NC/AT,  conjunctivae clear, sclera -anicteric  CHEST:  Full & symmetric excursion, no increased effort, breath sounds clear  HEART:  Regular rhythm, S1, S2, no murmur/rub/S3/S4,  no edema  ABDOMEN:  Soft, non-tender, non-distended, normoactive bowel sounds,  no masses ,no hepato-splenomegaly,   EXTREMITIES:  no cyanosis,clubbing or edema  SKIN:  No rash/erythema/ecchymoses/petechiae/wounds/abscess/warm/dry  NEURO:  Alert, oriented    Vital Signs:  Vital Signs Last 24 Hrs  T(C): 36.6 (26 Oct 2017 04:20), Max: 36.8 (25 Oct 2017 12:08)  T(F): 97.9 (26 Oct 2017 04:20), Max: 98.3 (25 Oct 2017 12:08)  HR: 78 (26 Oct 2017 04:20) (65 - 78)  BP: 96/65 (26 Oct 2017 04:20) (96/65 - 114/78)  BP(mean): --  RR: 18 (26 Oct 2017 04:20) (18 - 18)  SpO2: 98% (26 Oct 2017 04:20) (98% - 98%)  Daily     Daily     LABS:                        12.7   3.8   )-----------( 217      ( 25 Oct 2017 12:12 )             37.3     10-25    142  |  107  |  4<L>  ----------------------------<  89  4.1   |  24  |  0.62    Ca    8.8      25 Oct 2017 12:12        PT/INR - ( 24 Oct 2017 16:20 )   PT: 12.6 sec;   INR: 1.15 ratio         PTT - ( 24 Oct 2017 16:20 )  PTT:32.4 sec        Imaging:      < from: Colonoscopy (10.25.17 @ 17:15) >         Findings:       The perianal and digital rectal examinations were normal.       The colon (entire examined portion) appeared normal. Biopsies were taken with a cold forceps        for histology.       Diffuse inflammation, moderate in severityand characterized by aphthous ulcerations was        found in the terminal ileum. Biopsies were taken with a cold forceps for histology.                                                                                                        Impression:          - The entire examined colon is normal. Biopsied.                       - Ileitis, rule out Crohn's disease. Biopsied.  Recommendation:      - [facility].                       - Advance diet as tolerated.                       - Await pathology results.                       - Start Mesalamine 1gm four times a day.                       - Patient should return to GI clinic in 1-2 weeks.                       - Repeat colonoscopy in 6 months for surveillance.    < end of copied text >

## 2017-10-26 NOTE — PROGRESS NOTE ADULT - ASSESSMENT
21f hx type I vwd, chronic abdominal pain x 1 year presenting with acute on chronic abdominal pain x 2-3 days, underwent colonoscopy yesterday.    # Type 1 Von Willebrand disease  - f/u cbc today  - no intervention for Type 1 VWD at this time  - f/u OP hematologist Dr. Nelson  - Type O blood group also associated with lower levels of Von Willebrand factor      Reina Jang PGY-1  Pager # [phone]/ [phone]

## 2017-10-27 VITALS
TEMPERATURE: 98 F | OXYGEN SATURATION: 96 % | DIASTOLIC BLOOD PRESSURE: 74 MMHG | SYSTOLIC BLOOD PRESSURE: 113 MMHG | HEART RATE: 78 BPM | RESPIRATION RATE: 18 BRPM

## 2017-10-27 LAB
HCT VFR BLD CALC: 35.4 % — SIGNIFICANT CHANGE UP (ref 34.5–45)
HGB BLD-MCNC: 12.7 G/DL — SIGNIFICANT CHANGE UP (ref 11.5–15.5)
MCHC RBC-ENTMCNC: 29.7 PG — SIGNIFICANT CHANGE UP (ref 27–34)
MCHC RBC-ENTMCNC: 35.9 GM/DL — SIGNIFICANT CHANGE UP (ref 32–36)
MCV RBC AUTO: 82.7 FL — SIGNIFICANT CHANGE UP (ref 80–100)
PLATELET # BLD AUTO: 259 K/UL — SIGNIFICANT CHANGE UP (ref 150–400)
RBC # BLD: 4.28 M/UL — SIGNIFICANT CHANGE UP (ref 3.8–5.2)
RBC # FLD: 12 % — SIGNIFICANT CHANGE UP (ref 10.3–14.5)
WBC # BLD: 4.4 K/UL — SIGNIFICANT CHANGE UP (ref 3.8–10.5)
WBC # FLD AUTO: 4.4 K/UL — SIGNIFICANT CHANGE UP (ref 3.8–10.5)

## 2017-10-27 PROCEDURE — 82330 ASSAY OF CALCIUM: CPT

## 2017-10-27 PROCEDURE — 96375 TX/PRO/DX INJ NEW DRUG ADDON: CPT

## 2017-10-27 PROCEDURE — 82947 ASSAY GLUCOSE BLOOD QUANT: CPT

## 2017-10-27 PROCEDURE — 83631 LACTOFERRIN FECAL (QUANT): CPT

## 2017-10-27 PROCEDURE — 85730 THROMBOPLASTIN TIME PARTIAL: CPT

## 2017-10-27 PROCEDURE — 85247 CLOT FACTOR VIII MULTIMETRIC: CPT

## 2017-10-27 PROCEDURE — 85652 RBC SED RATE AUTOMATED: CPT

## 2017-10-27 PROCEDURE — 85246 CLOT FACTOR VIII VW ANTIGEN: CPT

## 2017-10-27 PROCEDURE — 85240 CLOT FACTOR VIII AHG 1 STAGE: CPT

## 2017-10-27 PROCEDURE — 99285 EMERGENCY DEPT VISIT HI MDM: CPT | Mod: 25

## 2017-10-27 PROCEDURE — 87045 FECES CULTURE AEROBIC BACT: CPT

## 2017-10-27 PROCEDURE — 80048 BASIC METABOLIC PNL TOTAL CA: CPT

## 2017-10-27 PROCEDURE — 85027 COMPLETE CBC AUTOMATED: CPT

## 2017-10-27 PROCEDURE — 82803 BLOOD GASES ANY COMBINATION: CPT

## 2017-10-27 PROCEDURE — 88305 TISSUE EXAM BY PATHOLOGIST: CPT

## 2017-10-27 PROCEDURE — 84295 ASSAY OF SERUM SODIUM: CPT

## 2017-10-27 PROCEDURE — 74177 CT ABD & PELVIS W/CONTRAST: CPT

## 2017-10-27 PROCEDURE — 86140 C-REACTIVE PROTEIN: CPT

## 2017-10-27 PROCEDURE — 87046 STOOL CULTR AEROBIC BACT EA: CPT

## 2017-10-27 PROCEDURE — 81025 URINE PREGNANCY TEST: CPT

## 2017-10-27 PROCEDURE — 90686 IIV4 VACC NO PRSV 0.5 ML IM: CPT

## 2017-10-27 PROCEDURE — 99239 HOSP IP/OBS DSCHRG MGMT >30: CPT

## 2017-10-27 PROCEDURE — 96374 THER/PROPH/DIAG INJ IV PUSH: CPT | Mod: XU

## 2017-10-27 PROCEDURE — 85245 CLOT FACTOR VIII VW RISTOCTN: CPT

## 2017-10-27 PROCEDURE — 85610 PROTHROMBIN TIME: CPT

## 2017-10-27 PROCEDURE — 80053 COMPREHEN METABOLIC PANEL: CPT

## 2017-10-27 PROCEDURE — 83993 ASSAY FOR CALPROTECTIN FECAL: CPT

## 2017-10-27 PROCEDURE — 81001 URINALYSIS AUTO W/SCOPE: CPT

## 2017-10-27 PROCEDURE — 86900 BLOOD TYPING SEROLOGIC ABO: CPT

## 2017-10-27 PROCEDURE — 84132 ASSAY OF SERUM POTASSIUM: CPT

## 2017-10-27 PROCEDURE — 86850 RBC ANTIBODY SCREEN: CPT

## 2017-10-27 PROCEDURE — 87086 URINE CULTURE/COLONY COUNT: CPT

## 2017-10-27 PROCEDURE — 82435 ASSAY OF BLOOD CHLORIDE: CPT

## 2017-10-27 PROCEDURE — 86901 BLOOD TYPING SEROLOGIC RH(D): CPT

## 2017-10-27 PROCEDURE — 85014 HEMATOCRIT: CPT

## 2017-10-27 PROCEDURE — 83605 ASSAY OF LACTIC ACID: CPT

## 2017-10-27 RX ORDER — MESALAMINE 400 MG
2 TABLET, DELAYED RELEASE (ENTERIC COATED) ORAL
Qty: 240 | Refills: 0 | OUTPATIENT
Start: 2017-10-27 | End: 2017-11-26

## 2017-10-27 RX ORDER — INFLUENZA VIRUS VACCINE 15; 15; 15; 15 UG/.5ML; UG/.5ML; UG/.5ML; UG/.5ML
0.5 SUSPENSION INTRAMUSCULAR ONCE
Qty: 0 | Refills: 0 | Status: COMPLETED | OUTPATIENT
Start: 2017-10-27 | End: 2017-10-27

## 2017-10-27 RX ADMIN — INFLUENZA VIRUS VACCINE 0.5 MILLILITER(S): 15; 15; 15; 15 SUSPENSION INTRAMUSCULAR at 15:26

## 2017-10-27 RX ADMIN — Medication 1000 MILLIGRAM(S): at 05:45

## 2017-10-27 RX ADMIN — Medication 1000 MILLIGRAM(S): at 11:38

## 2017-10-27 NOTE — PROGRESS NOTE ADULT - PROBLEM SELECTOR PLAN 1
History highly concerning for IBD (family history of IBD) vs. possible focal enteritis  - s/p colonoscopy with biopsy 10/25. f/u results   c/w mesalamine  tylenol prn pain  stool culture negative to date  h/h stable

## 2017-10-27 NOTE — PROGRESS NOTE ADULT - ASSESSMENT
21f hx as above presenting with acute on chronic abdominal pain s/p colonoscopy showing ileitis s/p biopsy

## 2017-10-27 NOTE — PROGRESS NOTE ADULT - SUBJECTIVE AND OBJECTIVE BOX
Patient is a 21y old  Female who presents with a chief complaint of Gastrointestinal hemorrhage (25 Oct 2017 10:22)        SUBJECTIVE / OVERNIGHT EVENTS: feels much better. tolerated full liquid diet well      MEDICATIONS  (STANDING):  influenza   Vaccine 0.5 milliLiter(s) IntraMuscular once  mesalamine ER Capsule 1000 milliGRAM(s) Oral four times a day  sodium chloride 0.9%. 1000 milliLiter(s) (75 mL/Hr) IV Continuous <Continuous>    MEDICATIONS  (PRN):  acetaminophen   Tablet. 650 milliGRAM(s) Oral every 6 hours PRN Moderate Pain (4 - 6)  ondansetron Injectable 4 milliGRAM(s) IV Push every 6 hours PRN Nausea and/or Vomiting      Vital Signs Last 24 Hrs  T(C): 36.7 (27 Oct 2017 03:49), Max: 37.1 (26 Oct 2017 21:14)  T(F): 98 (27 Oct 2017 03:49), Max: 98.8 (26 Oct 2017 21:14)  HR: 80 (27 Oct 2017 03:49) (66 - 80)  BP: 108/71 (27 Oct 2017 03:49) (102/62 - 115/80)  BP(mean): --  RR: 17 (27 Oct 2017 03:49) (17 - 18)  SpO2: 97% (27 Oct 2017 03:49) (97% - 100%)  CAPILLARY BLOOD GLUCOSE        I&O's Summary    26 Oct 2017 07:01  -  27 Oct 2017 07:00  --------------------------------------------------------  IN: 2885 mL / OUT: 700 mL / NET: 2185 mL        PHYSICAL EXAM:  GENERAL: NAD  HEAD:  Atraumatic, Normocephalic  EYES: conjunctiva and sclera clear  NECK: No JVD  CHEST/LUNG: CTA b/l  HEART: S1 S2 RRR  ABDOMEN: +BS Soft, nontender, nondistended  EXTREMITIES:  2+ DP Pulses, No c/c/e  NEUROLOGY: AAOx3, no focal deficits   SKIN: No rashes or lesions    LABS:                        12.7   4.40  )-----------( 259      ( 27 Oct 2017 07:41 )             35.4     10-25    142  |  107  |  4<L>  ----------------------------<  89  4.1   |  24  |  0.62    Ca    8.8      25 Oct 2017 12:12                RADIOLOGY & ADDITIONAL TESTS:    Imaging Personally Reviewed:  Consultant(s) Notes Reviewed: Heme   Care Discussed with Consultants/Other Providers: d/w GI - Dr. patel regarding discharge plan

## 2017-10-27 NOTE — CHART NOTE - NSCHARTNOTEFT_GEN_A_CORE
No active management required for patient's Type I VWD, can follow up regularly with OP heme/onc Dr. Nelson.   Heme/Onc signing off on case. Please call us if any further questions.  Resident Pager # [phone]    Reina Jang PGY-1  Pager # [phone]/ [phone]

## 2017-10-27 NOTE — PROGRESS NOTE ADULT - ATTENDING COMMENTS
discharge to follow up with gi for biopsy results if tolerates regular diet  discharge time 35 minutes  Dr. M. Luke  Medicine Hospitalist  [phone]
[facility]  [phone]
[facility]  [phone]

## 2017-10-30 ENCOUNTER — TRANSCRIPTION ENCOUNTER (OUTPATIENT)
Age: 22
End: 2017-10-30

## 2017-11-01 ENCOUNTER — APPOINTMENT (OUTPATIENT)
Dept: GASTROENTEROLOGY | Facility: CLINIC | Age: 22
End: 2017-11-01
Payer: MEDICAID

## 2017-11-01 VITALS
WEIGHT: 146 LBS | HEIGHT: 67 IN | OXYGEN SATURATION: 99 % | BODY MASS INDEX: 22.91 KG/M2 | DIASTOLIC BLOOD PRESSURE: 70 MMHG | TEMPERATURE: 98.6 F | SYSTOLIC BLOOD PRESSURE: 120 MMHG | HEART RATE: 68 BPM

## 2017-11-01 DIAGNOSIS — Z82.49 FAMILY HISTORY OF ISCHEMIC HEART DISEASE AND OTHER DISEASES OF THE CIRCULATORY SYSTEM: ICD-10-CM

## 2017-11-01 DIAGNOSIS — Z80.8 FAMILY HISTORY OF MALIGNANT NEOPLASM OF OTHER ORGANS OR SYSTEMS: ICD-10-CM

## 2017-11-01 DIAGNOSIS — Z80.3 FAMILY HISTORY OF MALIGNANT NEOPLASM OF BREAST: ICD-10-CM

## 2017-11-01 DIAGNOSIS — Z78.9 OTHER SPECIFIED HEALTH STATUS: ICD-10-CM

## 2017-11-01 DIAGNOSIS — F15.90 OTHER STIMULANT USE, UNSPECIFIED, UNCOMPLICATED: ICD-10-CM

## 2017-11-01 DIAGNOSIS — R14.0 ABDOMINAL DISTENSION (GASEOUS): ICD-10-CM

## 2017-11-01 PROBLEM — Z00.00 ENCOUNTER FOR PREVENTIVE HEALTH EXAMINATION: Status: ACTIVE | Noted: 2017-11-01

## 2017-11-01 LAB
CALPROTECTIN STL-MCNT: 138 UG/G — HIGH (ref 0–120)
VWF CBA/VWF AG PPP IA-RTO: SIGNIFICANT CHANGE UP

## 2017-11-01 PROCEDURE — 99204 OFFICE O/P NEW MOD 45 MIN: CPT

## 2017-11-01 RX ORDER — TRAMADOL HYDROCHLORIDE 50 MG/1
50 TABLET, COATED ORAL
Qty: 30 | Refills: 0 | Status: ACTIVE | COMMUNITY
Start: 2017-05-18

## 2017-11-01 RX ORDER — CLINDAMYCIN PHOSPHATE 1 G/10ML
1 GEL TOPICAL
Qty: 60 | Refills: 0 | Status: ACTIVE | COMMUNITY
Start: 2017-09-30

## 2017-11-01 RX ORDER — AMMONIUM LACTATE 12 %
12 CREAM (GRAM) TOPICAL
Qty: 280 | Refills: 0 | Status: ACTIVE | COMMUNITY
Start: 2017-09-30

## 2017-11-01 RX ORDER — BENZOYL PEROXIDE 5 G/100G
5 GEL TOPICAL
Qty: 60 | Refills: 0 | Status: ACTIVE | COMMUNITY
Start: 2017-09-30

## 2017-11-01 RX ORDER — KETOROLAC TROMETHAMINE 10 MG/1
10 TABLET, FILM COATED ORAL
Qty: 14 | Refills: 0 | Status: ACTIVE | COMMUNITY
Start: 2017-05-18

## 2017-11-06 LAB — LACTOFERRIN STL-MCNC: 56.12 — HIGH (ref 0–7.24)

## 2018-01-22 ENCOUNTER — APPOINTMENT (OUTPATIENT)
Dept: GASTROENTEROLOGY | Facility: CLINIC | Age: 23
End: 2018-01-22
Payer: MEDICAID

## 2018-01-22 VITALS
DIASTOLIC BLOOD PRESSURE: 69 MMHG | WEIGHT: 150 LBS | SYSTOLIC BLOOD PRESSURE: 100 MMHG | BODY MASS INDEX: 23.54 KG/M2 | TEMPERATURE: 98.3 F | OXYGEN SATURATION: 98 % | RESPIRATION RATE: 16 BRPM | HEART RATE: 93 BPM | HEIGHT: 67 IN

## 2018-01-22 DIAGNOSIS — Z86.2 PERSONAL HISTORY OF DISEASES OF THE BLOOD AND BLOOD-FORMING ORGANS AND CERTAIN DISORDERS INVOLVING THE IMMUNE MECHANISM: ICD-10-CM

## 2018-01-22 DIAGNOSIS — Z83.79 FAMILY HISTORY OF OTHER DISEASES OF THE DIGESTIVE SYSTEM: ICD-10-CM

## 2018-01-22 PROCEDURE — 99214 OFFICE O/P EST MOD 30 MIN: CPT

## 2018-01-22 RX ORDER — HYOSCYAMINE SULFATE 0.12 MG/1
0.12 TABLET SUBLINGUAL 3 TIMES DAILY
Qty: 50 | Refills: 1 | Status: ACTIVE | COMMUNITY
Start: 2018-01-22 | End: 1900-01-01

## 2018-01-22 RX ORDER — MESALAMINE 500 MG/1
500 CAPSULE ORAL 4 TIMES DAILY
Qty: 240 | Refills: 3 | Status: ACTIVE | COMMUNITY
Start: 2017-10-27 | End: 1900-01-01

## 2018-06-14 ENCOUNTER — APPOINTMENT (OUTPATIENT)
Dept: GASTROENTEROLOGY | Facility: CLINIC | Age: 23
End: 2018-06-14
Payer: MEDICAID

## 2018-06-14 VITALS
HEIGHT: 67 IN | HEART RATE: 82 BPM | SYSTOLIC BLOOD PRESSURE: 100 MMHG | OXYGEN SATURATION: 99 % | DIASTOLIC BLOOD PRESSURE: 70 MMHG | WEIGHT: 150 LBS | TEMPERATURE: 98 F | BODY MASS INDEX: 23.54 KG/M2

## 2018-06-14 PROCEDURE — 99214 OFFICE O/P EST MOD 30 MIN: CPT

## 2018-06-14 RX ORDER — AZITHROMYCIN 500 MG/1
500 TABLET, FILM COATED ORAL
Qty: 3 | Refills: 0 | Status: ACTIVE | COMMUNITY
Start: 2018-06-08

## 2018-06-14 RX ORDER — KETOTIFEN FUMARATE 0.35 MG/ML
0.03 SOLUTION/ DROPS OPHTHALMIC
Qty: 5 | Refills: 0 | Status: ACTIVE | COMMUNITY
Start: 2018-04-04

## 2018-06-14 RX ORDER — CETIRIZINE HYDROCHLORIDE 10 MG/1
10 TABLET, COATED ORAL
Qty: 30 | Refills: 0 | Status: ACTIVE | COMMUNITY
Start: 2018-05-25

## 2018-06-14 RX ORDER — OFLOXACIN 3 MG/ML
0.3 SOLUTION/ DROPS OPHTHALMIC
Qty: 5 | Refills: 0 | Status: ACTIVE | COMMUNITY
Start: 2018-05-25

## 2018-06-14 RX ORDER — IPRATROPIUM BROMIDE 21 UG/1
0.03 SPRAY NASAL
Qty: 30 | Refills: 0 | Status: ACTIVE | COMMUNITY
Start: 2018-05-30

## 2018-06-14 RX ORDER — MESALAMINE 375 MG/1
0.38 CAPSULE, EXTENDED RELEASE ORAL DAILY
Qty: 120 | Refills: 3 | Status: ACTIVE | COMMUNITY
Start: 2018-06-14 | End: 1900-01-01

## 2018-06-14 RX ORDER — ONDANSETRON 4 MG/1
4 TABLET ORAL
Qty: 60 | Refills: 5 | Status: ACTIVE | COMMUNITY
Start: 2018-06-14 | End: 1900-01-01

## 2018-06-22 ENCOUNTER — RX RENEWAL (OUTPATIENT)
Age: 23
End: 2018-06-22

## 2018-06-22 RX ORDER — DEXLANSOPRAZOLE 60 MG/1
60 CAPSULE, DELAYED RELEASE ORAL DAILY
Qty: 30 | Refills: 3 | Status: ACTIVE | COMMUNITY
Start: 2018-06-14 | End: 1900-01-01

## 2018-06-22 RX ORDER — LUBIPROSTONE 24 UG/1
24 CAPSULE, GELATIN COATED ORAL TWICE DAILY
Qty: 60 | Refills: 5 | Status: ACTIVE | COMMUNITY
Start: 2018-06-14 | End: 1900-01-01

## 2018-07-03 RX ORDER — OMEPRAZOLE 40 MG/1
40 CAPSULE, DELAYED RELEASE ORAL TWICE DAILY
Qty: 20 | Refills: 5 | Status: ACTIVE | COMMUNITY
Start: 2018-07-03 | End: 1900-01-01

## 2018-07-03 RX ORDER — LACTULOSE 20 G/20G
20 POWDER, FOR SOLUTION ORAL
Qty: 60 | Refills: 3 | Status: ACTIVE | COMMUNITY
Start: 2018-07-03 | End: 1900-01-01

## 2018-07-20 ENCOUNTER — RESULT REVIEW (OUTPATIENT)
Age: 23
End: 2018-07-20

## 2018-07-20 ENCOUNTER — APPOINTMENT (OUTPATIENT)
Dept: GASTROENTEROLOGY | Facility: HOSPITAL | Age: 23
End: 2018-07-20
Payer: MEDICAID

## 2018-07-20 ENCOUNTER — OUTPATIENT (OUTPATIENT)
Dept: OUTPATIENT SERVICES | Facility: HOSPITAL | Age: 23
LOS: 1 days | Discharge: HOME | End: 2018-07-20

## 2018-07-20 VITALS
WEIGHT: 149.91 LBS | TEMPERATURE: 98 F | DIASTOLIC BLOOD PRESSURE: 79 MMHG | HEART RATE: 74 BPM | HEIGHT: 64 IN | OXYGEN SATURATION: 99 % | RESPIRATION RATE: 16 BRPM | SYSTOLIC BLOOD PRESSURE: 126 MMHG

## 2018-07-20 VITALS
OXYGEN SATURATION: 98 % | HEART RATE: 65 BPM | RESPIRATION RATE: 16 BRPM | SYSTOLIC BLOOD PRESSURE: 116 MMHG | DIASTOLIC BLOOD PRESSURE: 74 MMHG

## 2018-07-20 DIAGNOSIS — Z90.89 ACQUIRED ABSENCE OF OTHER ORGANS: Chronic | ICD-10-CM

## 2018-07-20 DIAGNOSIS — K21.9 GASTRO-ESOPHAGEAL REFLUX DISEASE WITHOUT ESOPHAGITIS: ICD-10-CM

## 2018-07-20 DIAGNOSIS — Z90.49 ACQUIRED ABSENCE OF OTHER SPECIFIED PARTS OF DIGESTIVE TRACT: Chronic | ICD-10-CM

## 2018-07-20 LAB — HCG UR QL: NEGATIVE — SIGNIFICANT CHANGE UP

## 2018-07-20 PROCEDURE — 88312 SPECIAL STAINS GROUP 1: CPT | Mod: 26

## 2018-07-20 PROCEDURE — 88305 TISSUE EXAM BY PATHOLOGIST: CPT | Mod: 26

## 2018-07-20 PROCEDURE — 43239 EGD BIOPSY SINGLE/MULTIPLE: CPT

## 2018-07-20 RX ORDER — SODIUM CHLORIDE 9 MG/ML
1000 INJECTION, SOLUTION INTRAVENOUS
Qty: 0 | Refills: 0 | Status: DISCONTINUED | OUTPATIENT
Start: 2018-07-20 | End: 2018-07-20

## 2018-07-22 PROBLEM — Z78.9 ALCOHOL USE: Status: ACTIVE | Noted: 2017-11-01

## 2018-07-22 PROBLEM — Z80.8 FAMILY HISTORY OF SKIN CANCER: Status: ACTIVE | Noted: 2017-11-01

## 2018-07-23 DIAGNOSIS — Z91.14 PATIENT'S OTHER NONCOMPLIANCE WITH MEDICATION REGIMEN: ICD-10-CM

## 2018-07-23 DIAGNOSIS — Z88.5 ALLERGY STATUS TO NARCOTIC AGENT: ICD-10-CM

## 2018-07-23 DIAGNOSIS — R10.9 UNSPECIFIED ABDOMINAL PAIN: ICD-10-CM

## 2018-07-23 DIAGNOSIS — K50.10 CROHN'S DISEASE OF LARGE INTESTINE WITHOUT COMPLICATIONS: ICD-10-CM

## 2018-07-23 LAB — SURGICAL PATHOLOGY FINAL REPORT - CH: SIGNIFICANT CHANGE UP

## 2018-08-09 ENCOUNTER — APPOINTMENT (OUTPATIENT)
Dept: GASTROENTEROLOGY | Facility: CLINIC | Age: 23
End: 2018-08-09
Payer: MEDICAID

## 2018-08-09 VITALS
DIASTOLIC BLOOD PRESSURE: 73 MMHG | SYSTOLIC BLOOD PRESSURE: 105 MMHG | HEIGHT: 67 IN | BODY MASS INDEX: 23.54 KG/M2 | RESPIRATION RATE: 16 BRPM | HEART RATE: 82 BPM | TEMPERATURE: 98 F | OXYGEN SATURATION: 99 % | WEIGHT: 150 LBS

## 2018-08-09 DIAGNOSIS — R11.14 BILIOUS VOMITING: ICD-10-CM

## 2018-08-09 DIAGNOSIS — R10.9 UNSPECIFIED ABDOMINAL PAIN: ICD-10-CM

## 2018-08-09 DIAGNOSIS — K21.9 GASTRO-ESOPHAGEAL REFLUX DISEASE W/OUT ESOPHAGITIS: ICD-10-CM

## 2018-08-09 DIAGNOSIS — K52.9 NONINFECTIVE GASTROENTERITIS AND COLITIS, UNSPECIFIED: ICD-10-CM

## 2018-08-09 PROBLEM — D68.0 VON WILLEBRAND DISEASE: Chronic | Status: ACTIVE | Noted: 2017-10-24

## 2018-08-09 PROCEDURE — 99214 OFFICE O/P EST MOD 30 MIN: CPT

## 2018-08-09 RX ORDER — PANTOPRAZOLE 40 MG/1
40 TABLET, DELAYED RELEASE ORAL DAILY
Qty: 30 | Refills: 3 | Status: ACTIVE | COMMUNITY
Start: 2018-08-09 | End: 1900-01-01

## 2018-10-04 ENCOUNTER — APPOINTMENT (OUTPATIENT)
Dept: GASTROENTEROLOGY | Facility: CLINIC | Age: 23
End: 2018-10-04

## 2018-11-04 ENCOUNTER — TRANSCRIPTION ENCOUNTER (OUTPATIENT)
Age: 23
End: 2018-11-04

## 2019-09-26 ENCOUNTER — TRANSCRIPTION ENCOUNTER (OUTPATIENT)
Age: 24
End: 2019-09-26

## 2019-12-20 ENCOUNTER — TRANSCRIPTION ENCOUNTER (OUTPATIENT)
Age: 24
End: 2019-12-20

## 2019-12-30 ENCOUNTER — TRANSCRIPTION ENCOUNTER (OUTPATIENT)
Age: 24
End: 2019-12-30

## 2020-05-12 NOTE — ASU PATIENT PROFILE, ADULT - NS PRO AD INFO GIVEN Y
Answered questions about stopping breast feeding. Explained no meds to help in process. Discussed use of well fitted support bra, decrease stimulation to breast and comfort measures if develops engorgement. All questions answered.   yes

## 2024-08-01 PROBLEM — Z86.2 HISTORY OF VON WILLEBRAND'S DISEASE: Status: RESOLVED | Noted: 2017-11-01 | Resolved: 2024-08-01

## 2024-09-07 ENCOUNTER — NON-APPOINTMENT (OUTPATIENT)
Age: 29
End: 2024-09-07

## 2024-12-24 PROBLEM — F10.90 ALCOHOL USE: Status: ACTIVE | Noted: 2017-11-01

## 2025-03-06 ENCOUNTER — APPOINTMENT (OUTPATIENT)
Dept: COLORECTAL SURGERY | Facility: CLINIC | Age: 30
End: 2025-03-06
Payer: COMMERCIAL

## 2025-03-06 ENCOUNTER — APPOINTMENT (OUTPATIENT)
Dept: COLORECTAL SURGERY | Facility: CLINIC | Age: 30
End: 2025-03-06

## 2025-03-06 VITALS
RESPIRATION RATE: 15 BRPM | HEART RATE: 105 BPM | HEIGHT: 62 IN | BODY MASS INDEX: 23.37 KG/M2 | WEIGHT: 127 LBS | DIASTOLIC BLOOD PRESSURE: 72 MMHG | OXYGEN SATURATION: 99 % | SYSTOLIC BLOOD PRESSURE: 131 MMHG

## 2025-03-06 PROCEDURE — 99203 OFFICE O/P NEW LOW 30 MIN: CPT | Mod: 25

## 2025-03-06 PROCEDURE — 46600 DIAGNOSTIC ANOSCOPY SPX: CPT

## 2025-09-17 ENCOUNTER — APPOINTMENT (OUTPATIENT)
Dept: GASTROENTEROLOGY | Facility: CLINIC | Age: 30
End: 2025-09-17